# Patient Record
Sex: FEMALE | Race: BLACK OR AFRICAN AMERICAN | NOT HISPANIC OR LATINO | Employment: FULL TIME | URBAN - METROPOLITAN AREA
[De-identification: names, ages, dates, MRNs, and addresses within clinical notes are randomized per-mention and may not be internally consistent; named-entity substitution may affect disease eponyms.]

---

## 2017-01-10 ENCOUNTER — TELEPHONE (OUTPATIENT)
Dept: HEMATOLOGY/ONCOLOGY | Facility: CLINIC | Age: 72
End: 2017-01-10

## 2017-01-10 ENCOUNTER — OFFICE VISIT (OUTPATIENT)
Dept: PODIATRY | Facility: CLINIC | Age: 72
End: 2017-01-10
Payer: MEDICARE

## 2017-01-10 ENCOUNTER — LAB VISIT (OUTPATIENT)
Dept: LAB | Facility: HOSPITAL | Age: 72
End: 2017-01-10
Attending: INTERNAL MEDICINE
Payer: MEDICARE

## 2017-01-10 VITALS
HEIGHT: 62 IN | SYSTOLIC BLOOD PRESSURE: 119 MMHG | DIASTOLIC BLOOD PRESSURE: 65 MMHG | WEIGHT: 183 LBS | BODY MASS INDEX: 33.68 KG/M2 | HEART RATE: 74 BPM

## 2017-01-10 DIAGNOSIS — M76.829 POSTERIOR TIBIAL TENDON DYSFUNCTION: Primary | ICD-10-CM

## 2017-01-10 DIAGNOSIS — D46.4 REFRACTORY ANEMIA: ICD-10-CM

## 2017-01-10 DIAGNOSIS — M79.671 RIGHT FOOT PAIN: ICD-10-CM

## 2017-01-10 LAB
BASOPHILS # BLD AUTO: 0.03 K/UL
BASOPHILS NFR BLD: 0.4 %
DIFFERENTIAL METHOD: ABNORMAL
EOSINOPHIL # BLD AUTO: 0.1 K/UL
EOSINOPHIL NFR BLD: 0.6 %
ERYTHROCYTE [DISTWIDTH] IN BLOOD BY AUTOMATED COUNT: 14.4 %
HCT VFR BLD AUTO: 21.9 %
HGB BLD-MCNC: 7.4 G/DL
LYMPHOCYTES # BLD AUTO: 4.3 K/UL
LYMPHOCYTES NFR BLD: 55.1 %
MCH RBC QN AUTO: 28.1 PG
MCHC RBC AUTO-ENTMCNC: 33.8 %
MCV RBC AUTO: 83 FL
MONOCYTES # BLD AUTO: 0.4 K/UL
MONOCYTES NFR BLD: 5 %
NEUTROPHILS # BLD AUTO: 3 K/UL
NEUTROPHILS NFR BLD: 38.9 %
PLATELET # BLD AUTO: 236 K/UL
PMV BLD AUTO: 9 FL
RBC # BLD AUTO: 2.63 M/UL
WBC # BLD AUTO: 7.81 K/UL

## 2017-01-10 PROCEDURE — 99213 OFFICE O/P EST LOW 20 MIN: CPT | Mod: 25,S$GLB,, | Performed by: PODIATRIST

## 2017-01-10 PROCEDURE — 29540 STRAPPING ANKLE &/FOOT: CPT | Mod: RT,S$GLB,, | Performed by: PODIATRIST

## 2017-01-10 PROCEDURE — 99999 PR PBB SHADOW E&M-EST. PATIENT-LVL IV: CPT | Mod: PBBFAC,,, | Performed by: PODIATRIST

## 2017-01-10 PROCEDURE — 3078F DIAST BP <80 MM HG: CPT | Mod: S$GLB,,, | Performed by: PODIATRIST

## 2017-01-10 PROCEDURE — 1157F ADVNC CARE PLAN IN RCRD: CPT | Mod: S$GLB,,, | Performed by: PODIATRIST

## 2017-01-10 PROCEDURE — 1159F MED LIST DOCD IN RCRD: CPT | Mod: S$GLB,,, | Performed by: PODIATRIST

## 2017-01-10 PROCEDURE — 1160F RVW MEDS BY RX/DR IN RCRD: CPT | Mod: S$GLB,,, | Performed by: PODIATRIST

## 2017-01-10 PROCEDURE — 3074F SYST BP LT 130 MM HG: CPT | Mod: S$GLB,,, | Performed by: PODIATRIST

## 2017-01-10 PROCEDURE — 1125F AMNT PAIN NOTED PAIN PRSNT: CPT | Mod: S$GLB,,, | Performed by: PODIATRIST

## 2017-01-10 NOTE — PROGRESS NOTES
PODIATRY NOTE  PCP: Dr. Shayla Whaley MD    CHIEF COMPLAINT   Chief Complaint   Patient presents with    Follow-up     right PTTD pt has been wearing boot on and off, states occasional pain when out of boot and at night; no pain at current time;     Plantar Fasciitis     right plantar fasciitis       HPI  Adelina Caro is a 71 y.o. female who has a past medical history of Anemia; Anxiety; Arthritis; Asthma; Back pain; Benign colonic polyp; Bulging disc; Cervicalgia (9/17/2013); DDD (degenerative disc disease), lumbar; Depression; Diabetes mellitus; Diverticulosis; Diverticulosis; Hiatal hernia (3/10/2015); Hypertension; Pneumonia; Polyneuropathy; Postmenopausal; and Right rotator cuff tear.     Adelina presents to clinic today complaining of  Right medial arch pain. Pt is routinely seen by Dr. Curran. She states pain is present on medial arch. She has had this problem in the past. She has custom inserts and diabetic shoes. She does have history of neuropathy. She works at [employer] and is on her feet.     She states she is taking tramadol for her shoulder but this not helping her foot pain. She has been wearing CAM walker intermittently which has helped with her pain as well as bengay topical. Pain still present along posterior tibial tendon (pt points to this)    Patient denies other pedal complaints at this time.    Hemoglobin A1C   Date Value Ref Range Status   12/16/2016 5.8 4.5 - 6.2 % Final     Comment:     According to ADA guidelines, hemoglobin A1C <7.0% represents  optimal control in non-pregnant diabetic patients.  Different  metrics may apply to specific populations.   Standards of Medical Care in Diabetes - 2016.  For the purpose of screening for the presence of diabetes:  <5.7%     Consistent with the absence of diabetes  5.7-6.4%  Consistent with increasing risk for diabetes   (prediabetes)  >or=6.5%  Consistent with diabetes  Currently no consensus exists for use of hemoglobin A1C  for diagnosis of  "diabetes for children.     02/17/2016 5.9 4.5 - 6.2 % Final   10/19/2015 5.9 4.5 - 6.2 % Final       REVIEW OF SYSTEMS  General: Denies any fever or chills  Chest: Denies shortness of breath, wheezing, coughing, or sputum production  Heart: Denies chest pain, cold extremities, orthopenia, or reduced exercise tolerance  As noted above and per history of current illness above, otherwise negative in the remainder of the 14 systems.     PHYSICAL EXAM  Vitals:    01/10/17 1536   BP: 119/65   Pulse: 74   Weight: 83 kg (182 lb 15.7 oz)   Height: 5' 2" (1.575 m)   PainSc:   7   PainLoc: Foot       General: This patient is well-developed, well-nourished and appears stated age, well-oriented to person, place and time, and cooperative and pleasant on today's visit      LOWER EXTREMITY  Vascular exam:   · Dorsalis pedis and posterior tibial pulses palpable 2/4 bilaterally.   · Capillary refill time immediate to the toes.   · Feet are warm to the touch. Skin temperature warm to warm from proximally to distally   · There are no varicosities, telangiectasias noted to bilateral foot and ankle regions.   · There are no ecchymoses noted to bilateral foot and ankle regions.   · There is gross lower extremity edema.    Dermatologic exam:   · Skin moist with healthy texture and turgor.  · There are no open ulcerations, lacerations, or fissures to bilateral foot and ankle regions. There are no signs of infection as there is no erythema, no proximal-extending lymphangiitis, no fluctuance, or crepitus noted on palpation to bilateral foot and ankle regions.   · There is no interdigital maceration.   · There are no hyperkeratotic lesions noted to feet. Nails are well-trimmed.    Neurologic exam:  · Epicritic sensation is intact as the patient is able to sense light touch to bilateral foot and ankle regions.   · Achilles and patellar deep tendon reflexes intact  · Babinski reflex absent    Musculoskeletal/Orthopedic exam:   · No symptomatic " structural abnormalities noted  · Muscle strength AT/EHL/EDL/PT: 5/5; Achilles/Gastroc/Soleus: 5/5; PB/PL: 5/5 Muscle tone is normal.  · There is tenderness with palpation of posterior tibial tendon RIGHT foot  · There is tenderness upon palpation medial band of plantar fascia RIGHT  · Ankle joint ROM  B/L limitedDF/PF, non-crepitus  · STJ ROM limited inv/ev, non crepitus   · MTPJ b/l supple DF/PF, non crepitus  · Foot type: maximally pronated with decreased medial longitudinal arch     IMAGING   Reviewed by me and I agree with radiologist findings, 3 views of foot/ankle, reveal:  Results for orders placed during the hospital encounter of 12/01/16   X-Ray Foot Complete Right    Narrative 3 views of the right foot    Comparison: 10/24/2014    Findings: There is no evidence to suggest acute fracture or dislocation.  Dorsal and plantar calcaneal enthesophytes are present.  There is mild degenerative change at the talonavicular joint.  Minimal degenerative change and hallux deformity is present at the 1st MTP joint.    Impression  Above      Electronically signed by: BRADY MACIAS D.O.  Date:     12/01/16  Time:    16:01            ASSESSMENT  1. Posterior tibial tendon dysfunction - Right Foot     2. Acquired pes planovalgus, unspecified laterality           PLAN    1. Patient was educated about clinical and imaging findings, and verbalizes understanding of above.  2. Treatment plan: Patient was educated and counseled regarding tibial tendinitis. At this time, the patient agreed to proceed with conservative treatment for relative support and immobilization by ARIC quinteros with some mild compression applied by Elastoplast bandaging.   Pt declines further physical therapy  3. Rx dm shoes   4. F/u with Dr. Curran in 4-5 wks, if pain still persists consider other therapeutic modalities as patient declines complete immobilization in CAST therapy or Boot.    Future Appointments  Date Time Provider Department Center   1/12/2017  8:40 AM Gordon Mccoy MD [facility] HEM ONC [facility]   2/6/2017 8:30 AM [facility] US1 [facility] ULSOUND [facility]   2/9/2017 1:00 PM Shayla Whaley MD [facility]   2/15/2017 2:40 PM Catherine Curran DPM [facility] POD [facility]   2/17/2017 9:30 AM Sonya Owusu NP [facility] UROLOGY [facility]       Report Electronically Signed By:  Jacquie Washington DPM   Podiatric Medicine & Surgery  [facility]  1/10/2017

## 2017-01-10 NOTE — TELEPHONE ENCOUNTER
----- Message from Gordon Mccoy MD sent at 1/10/2017  3:21 PM CST -----  The patient's CBC has gotten worse I like to see the patient back in the next several days

## 2017-01-10 NOTE — MR AVS SNAPSHOT
[facility] - Podiatry  [address] [place] [address]  Phone: [phone]  Fax: [phone]                  Adelina Caro   1/10/2017 3:20 PM   Office Visit    Description:  Female : 1945   Provider:  Jacquie Washington DPM   Department:  [facility] - Podiatry           Reason for Visit     Follow-up     Plantar Fasciitis           Diagnoses this Visit        Comments    Posterior tibial tendon dysfunction    -  Primary     Acquired pes planovalgus, unspecified laterality                To Do List           Future Appointments        Provider Department Dept Phone    2017 8:40 AM Gordon Mccoy MD [facility] Hemotology Oncology [phone]    2017 8:30 AM [facility] US1 [facility] [phone]    2017 1:00 PM Shayla Whaley MD [facility] [phone]    2/15/2017 2:40 PM Catherine Curran DPM [facility] Podiatry [phone]    2017 9:30 AM Sonya Owusu NP [facility] Urology [phone]      Goals (5 Years of Data)     None      [facility]     [facility] Care [facility] -  Assistance  Registered nurses in the [facility] provide clinical advisement, health education, appointment booking, and other advisory services.  Call for this free service at [phone].             Medications           Message regarding Medications     Verify the changes and/or additions to your medication regime listed below are the same as discussed with your clinician today.  If any of these changes or additions are incorrect, please notify your healthcare provider.             Verify that the below list of medications is an accurate representation of the medications you are currently taking.  If none reported, the list may be blank. If incorrect, please contact your healthcare provider. Carry this list with you in case of emergency.           Current Medications     amitriptyline (ELAVIL) 10 MG tablet TAKE 1 TABLET (10 MG TOTAL) BY MOUTH NIGHTLY AS  NEEDED FOR INSOMNIA.    amlodipine-benazepril 5-20 mg (LOTREL) 5-20 mg per capsule Take 1 capsule by mouth every evening.     blood sugar diagnostic (FREESTYLE INSULINX TEST STRIPS) Strp 1 strip by Misc.(Non-Drug; Combo Route) route daily as needed. For Freestyle Lite Glucometer    blood-glucose meter (FREESTYLE SYSTEM KIT) kit Use as instructed - Freestyle Lite Glucometer    CYANOCOBALAMIN, VITAMIN B-12, (VITAMIN B-12 ORAL) Take 1 capsule by mouth once daily.    desonide (DESOWEN) 0.05 % cream Apply topically 2 (two) times daily. No longer than 2 weeks    dicyclomine (BENTYL) 10 MG capsule TAKE 1 CAPSULE (10 MG TOTAL) BY MOUTH 3 (THREE) TIMES DAILY.    fluticasone (FLONASE) 50 mcg/actuation nasal spray 2 sprays by Each Nare route daily as needed for Rhinitis.    gabapentin (NEURONTIN) 300 MG capsule TAKE 1 CAPSULE ON FIRST DAY THEN 1 CAPSULE TWICE A DAY FOR 1 DAY THEN AS DIRECTED    lancets (ONETOUCH DELICA LANCETS) 33 gauge Misc 1 lancet by Misc.(Non-Drug; Combo Route) route daily as needed. For Freestyle Lite Glucometer    metformin (GLUCOPHAGE) 500 MG tablet TAKE 2 TABLETS (1,000 MG TOTAL) BY MOUTH 2 (TWO) TIMES DAILY WITH MEALS.    methylPREDNISolone (MEDROL DOSEPACK) 4 mg tablet use as directed    montelukast (SINGULAIR) 10 mg tablet TAKE 1 TABLET (10 MG TOTAL) BY MOUTH EVERY EVENING.    MULTIVITAMIN W-MINERALS/LUTEIN (CENTRUM SILVER ORAL) Take by mouth once daily.    nabumetone (RELAFEN) 500 MG tablet Take 1 tablet (500 mg total) by mouth 2 (two) times daily.    pantoprazole (PROTONIX) 40 MG tablet TAKE 1 TABLET (40 MG TOTAL) BY MOUTH ONCE DAILY.    PYRIDOXINE HCL (VITAMIN B-6 ORAL) Take 1 tablet by mouth once daily.    sertraline (ZOLOFT) 50 MG tablet TAKE 1 TABLET (50 MG TOTAL) BY MOUTH ONCE DAILY.    tramadol (ULTRAM) 50 mg tablet Take 1 tablet (50 mg total) by mouth every 6 (six) hours as needed.    albuterol 90 mcg/actuation inhaler Inhale 2 puffs into the lungs every 6 (six) hours as needed for  "Wheezing.    cetirizine (ZYRTEC) 5 MG tablet Take 1 tablet (5 mg total) by mouth once daily.           Clinical Reference Information           Vital Signs - Last Recorded  Most recent update: 1/10/2017  3:39 PM by Leatha Worrell MA    BP Pulse Ht Wt LMP BMI    119/65 (BP Location: Right arm, Patient Position: Sitting, BP Method: Automatic) 74 5' 2" (1.575 m) 83 kg (182 lb 15.7 oz) (Exact Date) 33.47 kg/m2      Blood Pressure          Most Recent Value    BP  119/65      Allergies as of 1/10/2017     Bactrim [Sulfamethoxazole-trimethoprim]    Iodinated Contrast Media - Iv Dye      Immunizations Administered on Date of Encounter - 1/10/2017     None      "

## 2017-01-11 RX ORDER — GABAPENTIN 300 MG/1
CAPSULE ORAL
Qty: 60 CAPSULE | Refills: 5 | Status: SHIPPED | OUTPATIENT
Start: 2017-01-11

## 2017-01-12 ENCOUNTER — LAB VISIT (OUTPATIENT)
Dept: LAB | Facility: HOSPITAL | Age: 72
End: 2017-01-12
Attending: INTERNAL MEDICINE
Payer: MEDICARE

## 2017-01-12 ENCOUNTER — OFFICE VISIT (OUTPATIENT)
Dept: HEMATOLOGY/ONCOLOGY | Facility: CLINIC | Age: 72
End: 2017-01-12
Payer: MEDICARE

## 2017-01-12 VITALS
OXYGEN SATURATION: 98 % | SYSTOLIC BLOOD PRESSURE: 135 MMHG | HEART RATE: 82 BPM | TEMPERATURE: 98 F | DIASTOLIC BLOOD PRESSURE: 66 MMHG | RESPIRATION RATE: 20 BRPM | WEIGHT: 177.5 LBS | HEIGHT: 62 IN | BODY MASS INDEX: 32.66 KG/M2

## 2017-01-12 DIAGNOSIS — D46.4 REFRACTORY ANEMIA: ICD-10-CM

## 2017-01-12 DIAGNOSIS — D46.9 MDS (MYELODYSPLASTIC SYNDROME): Primary | ICD-10-CM

## 2017-01-12 DIAGNOSIS — D46.9 MDS (MYELODYSPLASTIC SYNDROME): ICD-10-CM

## 2017-01-12 LAB
FERRITIN SERPL-MCNC: 1475 NG/ML
HAPTOGLOB SERPL-MCNC: 190 MG/DL
IRON SERPL-MCNC: 58 UG/DL
LDH SERPL L TO P-CCNC: 194 U/L
RETICS/RBC NFR AUTO: 3.3 %
SATURATED IRON: 21 %
TOTAL IRON BINDING CAPACITY: 272 UG/DL
TRANSFERRIN SERPL-MCNC: 184 MG/DL
VIT B12 SERPL-MCNC: 537 PG/ML

## 2017-01-12 PROCEDURE — 36415 COLL VENOUS BLD VENIPUNCTURE: CPT | Mod: PO

## 2017-01-12 PROCEDURE — 1159F MED LIST DOCD IN RCRD: CPT | Mod: S$GLB,,, | Performed by: INTERNAL MEDICINE

## 2017-01-12 PROCEDURE — 3075F SYST BP GE 130 - 139MM HG: CPT | Mod: S$GLB,,, | Performed by: INTERNAL MEDICINE

## 2017-01-12 PROCEDURE — 83010 ASSAY OF HAPTOGLOBIN QUANT: CPT

## 2017-01-12 PROCEDURE — 99215 OFFICE O/P EST HI 40 MIN: CPT | Mod: S$GLB,,, | Performed by: INTERNAL MEDICINE

## 2017-01-12 PROCEDURE — 1160F RVW MEDS BY RX/DR IN RCRD: CPT | Mod: S$GLB,,, | Performed by: INTERNAL MEDICINE

## 2017-01-12 PROCEDURE — 82607 VITAMIN B-12: CPT

## 2017-01-12 PROCEDURE — 83540 ASSAY OF IRON: CPT

## 2017-01-12 PROCEDURE — 83615 LACTATE (LD) (LDH) ENZYME: CPT | Mod: PO

## 2017-01-12 PROCEDURE — 99999 PR PBB SHADOW E&M-EST. PATIENT-LVL III: CPT | Mod: PBBFAC,,, | Performed by: INTERNAL MEDICINE

## 2017-01-12 PROCEDURE — 84165 PROTEIN E-PHORESIS SERUM: CPT

## 2017-01-12 PROCEDURE — 82668 ASSAY OF ERYTHROPOIETIN: CPT

## 2017-01-12 PROCEDURE — 84165 PROTEIN E-PHORESIS SERUM: CPT | Mod: 26,,, | Performed by: PATHOLOGY

## 2017-01-12 PROCEDURE — 85045 AUTOMATED RETICULOCYTE COUNT: CPT

## 2017-01-12 PROCEDURE — 1157F ADVNC CARE PLAN IN RCRD: CPT | Mod: S$GLB,,, | Performed by: INTERNAL MEDICINE

## 2017-01-12 PROCEDURE — 99499 UNLISTED E&M SERVICE: CPT | Mod: S$GLB,,, | Performed by: INTERNAL MEDICINE

## 2017-01-12 PROCEDURE — 83520 IMMUNOASSAY QUANT NOS NONAB: CPT | Mod: 59

## 2017-01-12 PROCEDURE — 3078F DIAST BP <80 MM HG: CPT | Mod: S$GLB,,, | Performed by: INTERNAL MEDICINE

## 2017-01-12 PROCEDURE — 1126F AMNT PAIN NOTED NONE PRSNT: CPT | Mod: S$GLB,,, | Performed by: INTERNAL MEDICINE

## 2017-01-12 PROCEDURE — 82728 ASSAY OF FERRITIN: CPT

## 2017-01-12 NOTE — MR AVS SNAPSHOT
Patient Information     Patient Name Sex Adelina Stephenson Female 1945      Visit Information        Provider Department Dept Phone Center    2017 8:40 AM Gordon Mccoy MD [facility] Hematology Oncology [phone] [facility]      Patient Instructions     None      Your Current Medications Are     amitriptyline (ELAVIL) 10 MG tablet    amlodipine-benazepril 5-20 mg (LOTREL) 5-20 mg per capsule    blood sugar diagnostic (FREESTYLE INSULINX TEST STRIPS) Strp    blood-glucose meter (FREESTYLE SYSTEM KIT) kit    CYANOCOBALAMIN, VITAMIN B-12, (VITAMIN B-12 ORAL)    desonide (DESOWEN) 0.05 % cream    dicyclomine (BENTYL) 10 MG capsule    fluticasone (FLONASE) 50 mcg/actuation nasal spray    gabapentin (NEURONTIN) 300 MG capsule    lancets (ONETOUCH DELICA LANCETS) 33 gauge Misc    metformin (GLUCOPHAGE) 500 MG tablet    methylPREDNISolone (MEDROL DOSEPACK) 4 mg tablet    montelukast (SINGULAIR) 10 mg tablet    MULTIVITAMIN W-MINERALS/LUTEIN (CENTRUM SILVER ORAL)    nabumetone (RELAFEN) 500 MG tablet    pantoprazole (PROTONIX) 40 MG tablet    PYRIDOXINE HCL (VITAMIN B-6 ORAL)    sertraline (ZOLOFT) 50 MG tablet    tramadol (ULTRAM) 50 mg tablet    albuterol 90 mcg/actuation inhaler    cetirizine (ZYRTEC) 5 MG tablet      Appointments for Next Year     2017 12:10 PM NON FASTING LAB (5 min.) [facility]    Arrive at check-in approximately 15 minutes before your scheduled appointment time. Bring all outside medical records and imaging, along with a list of your current medications and insurance card.    [facility] 2nd [facility]    2017  9:00 AM INFUSION 015 MIN (15 min.) [facility] CHAIR [address] [facility]    Arrive at check-in approximately 15 minutes before your scheduled appointment time. Bring all outside medical records and imaging, along with a list of your current medications and insurance card.    2017  8:30 AM [facility]ROPER (45 min.) [facility]  [facility] [address]    Arrive at check-in approximately 15 minutes before your scheduled appointment time. Bring all outside medical records and imaging, along with a list of your current medications and insurance card.    (off [employer]) 2nd Floor    2/9/2017  1:00 PM ESTABLISHED PATIENT EXTENDED (30 min.) [facility] Shayla Whaley MD    Arrive at check-in approximately 15 minutes before your scheduled appointment time. Bring all outside medical records and imaging, along with a list of your current medications and insurance card.    2/15/2017  2:40 PM ESTABLISHED PATIENT (20 min.) [facility] Podiatry Catherine Curran DPM    Arrive at check-in approximately 15 minutes before your scheduled appointment time. Bring all outside medical records and imaging, along with a list of your current medications and insurance card.    (off [employer]) 2nd floor    2/17/2017  9:30 AM ESTABLISHED PATIENT (15 min.) [facility] Urology Sonya Owusu NP    Arrive at check-in approximately 15 minutes before your scheduled appointment time. Bring all outside medical records and imaging, along with a list of your current medications and insurance card.    (off [employer]) 4th floor         Default Flowsheet Data (last 24 hours)      Amb Complex Vitals Michi        01/12/17 0838                Measurements    Weight 80.5 kg (177 lb 7.5 oz)        Height 5' 1.5" (1.562 m)        BSA (Calculated - sq m) 1.87 sq meters        BMI (Calculated) 33.1        /66        Temp 98.2 °F (36.8 °C)        Pulse 82        Resp 20        SpO2 98 %        Pain Assessment    Pain Score Zero                Allergies     Bactrim [Sulfamethoxazole-trimethoprim]     Hospitalized due to acute kidney injury,encephalopathy  And hyponatremia    Iodinated Contrast Media - Iv Dye Anaphylaxis      Current Discharge Medication List     Cannot display discharge medications since this is not an admission.      "

## 2017-01-12 NOTE — PROGRESS NOTES
Subjective:       Patient ID: Adelina Caro is a 71 y.o. female.    Chief Complaint: Results and Anemia    Anemia   There has been no abdominal pain, bruising/bleeding easily, confusion, fever, light-headedness, pallor or palpitations. Signs of blood loss that are not present include vaginal bleeding.    71-year-old female who is referred for evaluation of anemia patient is seen by me in 2014 with extensive workup as well as bone marrow aspirate and biopsy which revealed normal chromosomal pattern with hypercellular line is consistent with refractory anemia patient has not been seen by me since    Past Medical History   Diagnosis Date    Anemia     Anxiety     Arthritis     Asthma     Back pain     Benign colonic polyp     Bulging disc      low back    Cervicalgia 9/17/2013    DDD (degenerative disc disease), lumbar     Depression     Diabetes mellitus      borderline    Diverticulosis     Diverticulosis     Hiatal hernia 3/10/2015     S/P hernia repair x  2    Hypertension     Pneumonia     Polyneuropathy     Postmenopausal      no history of abnormal pap smear    Right rotator cuff tear      right shoulder     Family History   Problem Relation Age of Onset    Colon cancer Sister     Colon cancer Sister     Ovarian cancer Sister     Hypertension Mother     Heart disease Mother      MI    Breast cancer Mother     Cataracts Mother     Diabetes Sister     Cancer Sister      unknown    Heart disease Sister      MI/CAD    Stroke Neg Hx      Social History     Social History    Marital status:      Spouse name: N/A    Number of children: 3    Years of education: N/A     Occupational History    [employer]     Social History Main Topics    Smoking status: Former Smoker     Packs/day: 1.00     Years: 3.00     Types: Cigarettes     Quit date: 8/22/1985    Smokeless tobacco: Never Used    Alcohol use No    Drug use: No    Sexual  activity: No     Other Topics Concern    Not on file     Social History Narrative    She wears seatbelt.     Past Surgical History   Procedure Laterality Date    Hernia surgery       left lower abd wall;  mesh used    Tubal ligation      Hernia repair      Total abdominal hysterectomy w/ bilateral salpingoophorectomy      Bladder surgery      Colonoscopy      Bone marrow biopsy         Labs:  Lab Results   Component Value Date    WBC 7.81 01/10/2017    HGB 7.4 (L) 01/10/2017    HCT 21.9 (L) 01/10/2017    MCV 83 01/10/2017     01/10/2017     BMP  Lab Results   Component Value Date     12/16/2016    K 4.6 12/16/2016     12/16/2016    CO2 26 12/16/2016    BUN 15 12/16/2016    CREATININE 0.8 12/16/2016    CALCIUM 9.4 12/16/2016    ANIONGAP 8 12/16/2016    ESTGFRAFRICA >60.0 12/16/2016    EGFRNONAA >60.0 12/16/2016     Lab Results   Component Value Date    ALT 18 05/27/2016    AST 27 05/27/2016    ALKPHOS 50 (L) 05/27/2016    BILITOT 0.5 05/27/2016       Lab Results   Component Value Date    IRON 66 03/31/2015    TIBC 277 03/31/2015    FERRITIN 948 (H) 03/31/2015     Lab Results   Component Value Date    XUIXROIZ70 606 11/15/2013     No results found for: FOLATE  Lab Results   Component Value Date    TSH 1.021 02/17/2016         Review of Systems   Constitutional: Positive for fatigue. Negative for activity change, appetite change, chills, diaphoresis, fever and unexpected weight change.   HENT: Negative for congestion, dental problem, drooling, ear discharge, ear pain, facial swelling, hearing loss, mouth sores, nosebleeds, postnasal drip, rhinorrhea, sinus pressure, sneezing, sore throat, tinnitus, trouble swallowing and voice change.    Eyes: Negative for photophobia, pain, discharge, redness, itching and visual disturbance.   Respiratory: Negative for cough, choking, chest tightness, shortness of breath, wheezing and stridor.    Cardiovascular: Negative for chest pain, palpitations and leg  swelling.   Gastrointestinal: Negative for abdominal distention, abdominal pain, anal bleeding, blood in stool, constipation, diarrhea, nausea, rectal pain and vomiting.   Endocrine: Negative for cold intolerance, heat intolerance, polydipsia, polyphagia and polyuria.   Genitourinary: Negative for decreased urine volume, difficulty urinating, dyspareunia, dysuria, enuresis, flank pain, frequency, genital sores, hematuria, menstrual problem, pelvic pain, urgency, vaginal bleeding, vaginal discharge and vaginal pain.   Musculoskeletal: Negative for arthralgias, back pain, gait problem, joint swelling, myalgias, neck pain and neck stiffness.   Skin: Negative for color change, pallor and rash.   Allergic/Immunologic: Negative for environmental allergies, food allergies and immunocompromised state.   Neurological: Positive for weakness. Negative for dizziness, tremors, seizures, syncope, facial asymmetry, speech difficulty, light-headedness, numbness and headaches.   Hematological: Negative for adenopathy. Does not bruise/bleed easily.   Psychiatric/Behavioral: Negative for agitation, behavioral problems, confusion, decreased concentration, dysphoric mood, hallucinations, self-injury, sleep disturbance and suicidal ideas. The patient is not nervous/anxious and is not hyperactive.        Objective:      Physical Exam   Constitutional: She is oriented to person, place, and time. She appears well-developed and well-nourished. No distress.   HENT:   Head: Normocephalic and atraumatic.   Right Ear: External ear normal.   Left Ear: External ear normal.   Nose: Nose normal. Right sinus exhibits no maxillary sinus tenderness and no frontal sinus tenderness. Left sinus exhibits no maxillary sinus tenderness and no frontal sinus tenderness.   Mouth/Throat: Oropharynx is clear and moist. No oropharyngeal exudate.   Eyes: Conjunctivae, EOM and lids are normal. Pupils are equal, round, and reactive to light. Right eye exhibits no  discharge. Left eye exhibits no discharge. Right conjunctiva is not injected. Right conjunctiva has no hemorrhage. Left conjunctiva is not injected. Left conjunctiva has no hemorrhage. No scleral icterus.   Neck: Normal range of motion. Neck supple. No JVD present. No tracheal deviation present. No thyromegaly present.   Cardiovascular: Normal rate, regular rhythm, normal heart sounds and intact distal pulses.    Pulmonary/Chest: Effort normal and breath sounds normal. No stridor. No respiratory distress. She exhibits no tenderness.   Abdominal: Soft. Bowel sounds are normal. She exhibits no distension and no mass. There is no splenomegaly or hepatomegaly. There is no tenderness. There is no rebound.   Musculoskeletal: Normal range of motion. She exhibits no edema or tenderness.   Lymphadenopathy:     She has no cervical adenopathy.     She has no axillary adenopathy.        Right: No supraclavicular adenopathy present.        Left: No supraclavicular adenopathy present.   Neurological: She is alert and oriented to person, place, and time. No cranial nerve deficit. Coordination normal.   Skin: Skin is dry. No rash noted. She is not diaphoretic. No erythema.   Psychiatric: She has a normal mood and affect. Her behavior is normal. Judgment and thought content normal.   Vitals reviewed.          Assessment:       1. MDS (myelodysplastic syndrome)    2. Refractory anemia            Plan:         high likelihood refractory anemia.  No evidence of excess blasts noted on previous marrow in 2015 at this point my recommendations are that the patient have laboratory baseline today including erythropoietin level and iron status if acceptable would recommend start on Procrit 20,000 units weekly with repeat CBC in 3 weeks for response to therapy discussed indications with her time spent 40 minutes [id number]

## 2017-01-13 LAB
ALBUMIN SERPL ELPH-MCNC: 3.8 G/DL
ALPHA1 GLOB SERPL ELPH-MCNC: 0.31 G/DL
ALPHA2 GLOB SERPL ELPH-MCNC: 0.88 G/DL
B-GLOBULIN SERPL ELPH-MCNC: 0.81 G/DL
GAMMA GLOB SERPL ELPH-MCNC: 1.1 G/DL
KAPPA LC SER QL IA: 3.76 MG/DL
KAPPA LC/LAMBDA SER IA: 1.83
LAMBDA LC SER QL IA: 2.05 MG/DL
PATHOLOGIST INTERPRETATION SPE: NORMAL
PROT SERPL-MCNC: 6.9 G/DL

## 2017-01-16 LAB — ERYTHROPOIETIN: 30.2 MIU/ML

## 2017-01-24 ENCOUNTER — INFUSION (OUTPATIENT)
Dept: INFUSION THERAPY | Facility: HOSPITAL | Age: 72
End: 2017-01-24
Attending: INTERNAL MEDICINE
Payer: MEDICARE

## 2017-01-24 VITALS — SYSTOLIC BLOOD PRESSURE: 129 MMHG | HEART RATE: 73 BPM | TEMPERATURE: 98 F | DIASTOLIC BLOOD PRESSURE: 71 MMHG

## 2017-01-24 DIAGNOSIS — D46.4 REFRACTORY ANEMIA: ICD-10-CM

## 2017-01-24 DIAGNOSIS — D46.9 MDS (MYELODYSPLASTIC SYNDROME): Primary | ICD-10-CM

## 2017-01-24 PROCEDURE — 96372 THER/PROPH/DIAG INJ SC/IM: CPT | Mod: PO

## 2017-01-24 PROCEDURE — 63600175 PHARM REV CODE 636 W HCPCS: Mod: PO | Performed by: INTERNAL MEDICINE

## 2017-01-24 RX ADMIN — ERYTHROPOIETIN 20000 UNITS: 20000 INJECTION, SOLUTION INTRAVENOUS; SUBCUTANEOUS at 09:01

## 2017-01-24 NOTE — PATIENT INSTRUCTIONS
[facility]  [address] [facility] [address]  [address] [address]  [phone] phone     [phone] fax  Hours of Operation: Monday- Friday 8:00am- 5:00pm  After hours phone  [phone]  Hematology / Oncology Physicians on call      Dr. Darius Pitts    Please call with any concerns regarding your appointment today.

## 2017-01-24 NOTE — MR AVS SNAPSHOT
Patient Information     Patient Name Sex Adelina Stephenson Female 1945      Visit Information        Provider Department Dept Phone Center    2017 9:00 AM [facility] Chemo Infusion [facility] Chemotherapy Infusion [phone] [facility]      Patient Instructions      [facility]  [address] [facility] [address]  [address] [address]  [phone] phone     [phone] fax  Hours of Operation: Monday- Friday 8:00am- 5:00pm  After hours phone  [phone]  Hematology / Oncology Physicians on call      Dr. Darius Pitts    Please call with any concerns regarding your appointment today.       Your Current Medications Are     amitriptyline (ELAVIL) 10 MG tablet    amlodipine-benazepril 5-20 mg (LOTREL) 5-20 mg per capsule    blood sugar diagnostic (FREESTYLE INSULINX TEST STRIPS) Strp    blood-glucose meter (FREESTYLE SYSTEM KIT) kit    CYANOCOBALAMIN, VITAMIN B-12, (VITAMIN B-12 ORAL)    desonide (DESOWEN) 0.05 % cream    dicyclomine (BENTYL) 10 MG capsule    fluticasone (FLONASE) 50 mcg/actuation nasal spray    gabapentin (NEURONTIN) 300 MG capsule    lancets (ONETOUCH DELICA LANCETS) 33 gauge Misc    metformin (GLUCOPHAGE) 500 MG tablet    methylPREDNISolone (MEDROL DOSEPACK) 4 mg tablet    montelukast (SINGULAIR) 10 mg tablet    MULTIVITAMIN W-MINERALS/LUTEIN (CENTRUM SILVER ORAL)    nabumetone (RELAFEN) 500 MG tablet    pantoprazole (PROTONIX) 40 MG tablet    PYRIDOXINE HCL (VITAMIN B-6 ORAL)    sertraline (ZOLOFT) 50 MG tablet    tramadol (ULTRAM) 50 mg tablet    albuterol 90 mcg/actuation inhaler    cetirizine (ZYRTEC) 5 MG tablet      Facility-Administered Medications     epoetin teresa injection 20,000 Units      Appointments for Next Year     2017  9:30 AM INFUSION 015 MIN (15 min.) [facility]    Arrive at check-in approximately 15 minutes before your scheduled appointment time. Bring all outside medical  records and imaging, along with a list of your current medications and insurance card.    2/6/2017  8:30 AM US RETROPER (45 min.) [facility] US1    Arrive at check-in approximately 15 minutes before your scheduled appointment time. Bring all outside medical records and imaging, along with a list of your current medications and insurance card.    (off [address]) 2nd Floor    2/6/2017  9:30 AM INFUSION 015 MIN (15 min.) [facility] CHAIR 01 SUM    Arrive at check-in approximately 15 minutes before your scheduled appointment time. Bring all outside medical records and imaging, along with a list of your current medications and insurance card.    2/9/2017  1:00 PM ESTABLISHED PATIENT EXTENDED (30 min.) [facility] Shayla Whaley MD    Arrive at check-in approximately 15 minutes before your scheduled appointment time. Bring all outside medical records and imaging, along with a list of your current medications and insurance card.    2/15/2017  1:30 PM NON FASTING LAB (5 min.) [facility]    Arrive at check-in approximately 15 minutes before your scheduled appointment time. Bring all outside medical records and imaging, along with a list of your current medications and insurance card.    (off [address]) 2nd floor    2/15/2017  2:00 PM ESTABLISHED PATIENT (20 min.) [facility] Hemotology Oncology Gordon Mccoy MD    Arrive at check-in approximately 15 minutes before your scheduled appointment time. Bring all outside medical records and imaging, along with a list of your current medications and insurance card.    (off [address]) 3rd Floor    2/15/2017  2:30 PM INFUSION 015 MIN (15 min.) [facility] CHAIR 01 SUM    Arrive at check-in approximately 15 minutes before your scheduled appointment time. Bring all outside medical records and imaging, along with a list of your current medications  and insurance card.    2/15/2017  2:40 PM ESTABLISHED PATIENT (20 min.) [facility] - Podiatry Catherine Curran DPM    Arrive at check-in approximately 15 minutes before your scheduled appointment time. Bring all outside medical records and imaging, along with a list of your current medications and insurance card.    (off [address]) 2nd floor    2/17/2017  9:30 AM ESTABLISHED PATIENT (15 min.) [facility] - Urology Sonya Owusu NP    Arrive at check-in approximately 15 minutes before your scheduled appointment time. Bring all outside medical records and imaging, along with a list of your current medications and insurance card.    (off [address]) 4th floor         Default Flowsheet Data (last 24 hours)      Amb Complex Vitals Michi        01/24/17 0858                Measurements    /71        Temp 98.2 °F (36.8 °C)        Pulse 73        Pain Assessment    Pain Score Four        Pain Frequency 2 Intermittent        Pain Loc LEG   right                Allergies     Bactrim [Sulfamethoxazole-trimethoprim]     Hospitalized due to acute kidney injury,encephalopathy  And hyponatremia    Iodinated Contrast Media - Iv Dye Anaphylaxis      Medications You Received from 01/23/2017 0911 to 01/24/2017 0911        Date/Time Order Dose Route Action     01/24/2017 0904 epoetin teresa injection 20,000 Units 20,000 Units Subcutaneous Given      Current Discharge Medication List     Cannot display discharge medications since this is not an admission.

## 2017-01-25 RX ORDER — TRAMADOL HYDROCHLORIDE 50 MG/1
TABLET ORAL
Qty: 30 TABLET | Refills: 0 | Status: SHIPPED | OUTPATIENT
Start: 2017-01-25 | End: 2017-05-02 | Stop reason: SDUPTHER

## 2017-01-31 ENCOUNTER — INFUSION (OUTPATIENT)
Dept: INFUSION THERAPY | Facility: HOSPITAL | Age: 72
End: 2017-01-31
Attending: INTERNAL MEDICINE
Payer: MEDICARE

## 2017-01-31 VITALS — HEART RATE: 80 BPM | SYSTOLIC BLOOD PRESSURE: 126 MMHG | DIASTOLIC BLOOD PRESSURE: 69 MMHG | RESPIRATION RATE: 18 BRPM

## 2017-01-31 DIAGNOSIS — D46.4 REFRACTORY ANEMIA: ICD-10-CM

## 2017-01-31 DIAGNOSIS — D46.9 MDS (MYELODYSPLASTIC SYNDROME): Primary | ICD-10-CM

## 2017-01-31 PROCEDURE — 96372 THER/PROPH/DIAG INJ SC/IM: CPT | Mod: PO

## 2017-01-31 PROCEDURE — 63600175 PHARM REV CODE 636 W HCPCS: Mod: PO | Performed by: INTERNAL MEDICINE

## 2017-01-31 RX ADMIN — ERYTHROPOIETIN 20000 UNITS: 20000 INJECTION, SOLUTION INTRAVENOUS; SUBCUTANEOUS at 09:01

## 2017-01-31 NOTE — MR AVS SNAPSHOT
Patient Information     Patient Name Sex     Adelina Caro Female 1945      Visit Information        Provider Department Dept Phone Center    2017 9:30 AM [facility] Chemo Infusion [facility] Chemotherapy Infusion [phone] [facility]      Patient Instructions     None      Your Current Medications Are     albuterol 90 mcg/actuation inhaler    amitriptyline (ELAVIL) 10 MG tablet    amlodipine-benazepril 5-20 mg (LOTREL) 5-20 mg per capsule    blood sugar diagnostic (FREESTYLE INSULINX TEST STRIPS) Strp    blood-glucose meter (FREESTYLE SYSTEM KIT) kit    cetirizine (ZYRTEC) 5 MG tablet    CYANOCOBALAMIN, VITAMIN B-12, (VITAMIN B-12 ORAL)    desonide (DESOWEN) 0.05 % cream    dicyclomine (BENTYL) 10 MG capsule    fluticasone (FLONASE) 50 mcg/actuation nasal spray    gabapentin (NEURONTIN) 300 MG capsule    lancets (ONETOUCH DELICA LANCETS) 33 gauge Misc    metformin (GLUCOPHAGE) 500 MG tablet    methylPREDNISolone (MEDROL DOSEPACK) 4 mg tablet    montelukast (SINGULAIR) 10 mg tablet    MULTIVITAMIN W-MINERALS/LUTEIN (CENTRUM SILVER ORAL)    nabumetone (RELAFEN) 500 MG tablet    pantoprazole (PROTONIX) 40 MG tablet    PYRIDOXINE HCL (VITAMIN B-6 ORAL)    sertraline (ZOLOFT) 50 MG tablet    tramadol (ULTRAM) 50 mg tablet      Facility-Administered Medications     epoetin teresa injection 20,000 Units      Appointments for Next Year     2017  8:30 AM US RETROPER (45 min.) [facility] US1    Arrive at check-in approximately 15 minutes before your scheduled appointment time. Bring all outside medical records and imaging, along with a list of your current medications and insurance card.    (off [address] [facility]) 2nd Floor    2017  9:30 AM INFUSION 015 MIN (15 min.) [facility] CHAIR 01 [facility]    Arrive at check-in approximately 15 minutes before your scheduled appointment time. Bring all outside medical records and imaging, along with a list of your current medications and  insurance card.    2/9/2017  1:00 PM ESTABLISHED PATIENT EXTENDED (30 min.) [facility] Shayla Whaley MD    Arrive at check-in approximately 15 minutes before your scheduled appointment time. Bring all outside medical records and imaging, along with a list of your current medications and insurance card.    2/15/2017  1:30 PM NON FASTING LAB (5 min.) [facility] LABORATORY, L[facility]IE    Arrive at check-in approximately 15 minutes before your scheduled appointment time. Bring all outside medical records and imaging, along with a list of your current medications and insurance card.    (off [address]) 2nd floor    2/15/2017  2:00 PM ESTABLISHED PATIENT (20 min.) [facility] Hemotology Oncology Gordon Mccoy MD    Arrive at check-in approximately 15 minutes before your scheduled appointment time. Bring all outside medical records and imaging, along with a list of your current medications and insurance card.    (off [address]) 3rd Floor    2/15/2017  2:30 PM INFUSION 015 MIN (15 min.) [facility] CHAIR 01 [facility]    Arrive at check-in approximately 15 minutes before your scheduled appointment time. Bring all outside medical records and imaging, along with a list of your current medications and insurance card.    2/15/2017  2:40 PM ESTABLISHED PATIENT (20 min.) [facility] - Podiatry Catherine Curran DPM    Arrive at check-in approximately 15 minutes before your scheduled appointment time. Bring all outside medical records and imaging, along with a list of your current medications and insurance card.    (off [address]) 2nd floor    2/17/2017  9:30 AM ESTABLISHED PATIENT (15 min.) [facility] - Urology Sonya Owusu NP    Arrive at check-in approximately 15 minutes before your scheduled appointment time. Bring all outside medical records and imaging, along with a list of your current medications and insurance card.    (off [address]) 4th floor         Default  Flowsheet Data (last 24 hours)      Amb Complex Vitals Michi        01/31/17 0924                Measurements    /69        Pulse 80        Resp 18        Pain Assessment    Pain Score Zero                Allergies     Bactrim [Sulfamethoxazole-trimethoprim]     Hospitalized due to acute kidney injury,encephalopathy  And hyponatremia    Iodinated Contrast Media - Iv Dye Anaphylaxis      Medications You Received from 01/30/2017 0932 to 01/31/2017 0932        Date/Time Order Dose Route Action     01/31/2017 0930 epoetin teresa injection 20,000 Units 20,000 Units Subcutaneous Given      Current Discharge Medication List     Cannot display discharge medications since this is not an admission.

## 2017-02-03 ENCOUNTER — TELEPHONE (OUTPATIENT)
Dept: RADIOLOGY | Facility: HOSPITAL | Age: 72
End: 2017-02-03

## 2017-02-06 ENCOUNTER — INFUSION (OUTPATIENT)
Dept: INFUSION THERAPY | Facility: HOSPITAL | Age: 72
End: 2017-02-06
Attending: INTERNAL MEDICINE
Payer: MEDICARE

## 2017-02-06 ENCOUNTER — HOSPITAL ENCOUNTER (OUTPATIENT)
Dept: RADIOLOGY | Facility: HOSPITAL | Age: 72
Discharge: HOME | End: 2017-02-06
Attending: UROLOGY
Payer: MEDICARE

## 2017-02-06 VITALS
SYSTOLIC BLOOD PRESSURE: 135 MMHG | RESPIRATION RATE: 18 BRPM | TEMPERATURE: 98 F | HEART RATE: 87 BPM | DIASTOLIC BLOOD PRESSURE: 72 MMHG

## 2017-02-06 DIAGNOSIS — D46.4 REFRACTORY ANEMIA: ICD-10-CM

## 2017-02-06 DIAGNOSIS — Q61.02 MULTIPLE RENAL CYSTS: ICD-10-CM

## 2017-02-06 DIAGNOSIS — D46.9 MDS (MYELODYSPLASTIC SYNDROME): Primary | ICD-10-CM

## 2017-02-06 PROCEDURE — 63600175 PHARM REV CODE 636 W HCPCS: Mod: PO | Performed by: INTERNAL MEDICINE

## 2017-02-06 PROCEDURE — 76770 US EXAM ABDO BACK WALL COMP: CPT | Mod: 26,,, | Performed by: RADIOLOGY

## 2017-02-06 PROCEDURE — 96372 THER/PROPH/DIAG INJ SC/IM: CPT | Mod: PO

## 2017-02-06 RX ADMIN — ERYTHROPOIETIN 20000 UNITS: 20000 INJECTION, SOLUTION INTRAVENOUS; SUBCUTANEOUS at 09:02

## 2017-02-06 NOTE — MR AVS SNAPSHOT
Patient Information     Patient Name Sex Adelina Stephenson Female 1945      Visit Information        Provider Department Dept Phone Center    2017 9:30 AM [facility] Chemo Infusion [facility] Chemotherapy Infusion [phone] [facility]      Patient Instructions      [facility]  [address] [facility] [address]  [address] [address]  [phone] phone     [phone] fax  Hours of Operation: Monday- Friday 8:00am- 5:00pm  After hours phone  [phone]  Hematology / Oncology Physicians on call      Dr. Darius Pitts    Please call with any concerns regarding your appointment today.       Your Current Medications Are     albuterol 90 mcg/actuation inhaler    amitriptyline (ELAVIL) 10 MG tablet    amlodipine-benazepril 5-20 mg (LOTREL) 5-20 mg per capsule    blood sugar diagnostic (FREESTYLE INSULINX TEST STRIPS) Strp    blood-glucose meter (FREESTYLE SYSTEM KIT) kit    cetirizine (ZYRTEC) 5 MG tablet    CYANOCOBALAMIN, VITAMIN B-12, (VITAMIN B-12 ORAL)    desonide (DESOWEN) 0.05 % cream    dicyclomine (BENTYL) 10 MG capsule    fluticasone (FLONASE) 50 mcg/actuation nasal spray    gabapentin (NEURONTIN) 300 MG capsule    lancets (ONETOUCH DELICA LANCETS) 33 gauge Misc    metformin (GLUCOPHAGE) 500 MG tablet    methylPREDNISolone (MEDROL DOSEPACK) 4 mg tablet    montelukast (SINGULAIR) 10 mg tablet    MULTIVITAMIN W-MINERALS/LUTEIN (CENTRUM SILVER ORAL)    nabumetone (RELAFEN) 500 MG tablet    pantoprazole (PROTONIX) 40 MG tablet    PYRIDOXINE HCL (VITAMIN B-6 ORAL)    sertraline (ZOLOFT) 50 MG tablet    tramadol (ULTRAM) 50 mg tablet      Facility-Administered Medications     epoetin teresa injection 20,000 Units      Appointments for Next Year     2017  9:30 AM INFUSION 015 MIN (15 min.) [facility] CHAIR 01 [facility]    Arrive at check-in approximately 15 minutes before your scheduled appointment time. Bring all outside medical  records and imaging, along with a list of your current medications and insurance card.    2/9/2017  1:00 PM ESTABLISHED PATIENT EXTENDED (30 min.) [facility] Shayla Whaley MD    Arrive at check-in approximately 15 minutes before your scheduled appointment time. Bring all outside medical records and imaging, along with a list of your current medications and insurance card.    2/15/2017  1:30 PM NON FASTING LAB (5 min.) [facility]    Arrive at check-in approximately 15 minutes before your scheduled appointment time. Bring all outside medical records and imaging, along with a list of your current medications and insurance card.    (off [address]) 2nd floor    2/15/2017  2:00 PM ESTABLISHED PATIENT (20 min.) [facility] Hemotology Oncology Gordon Mccoy MD    Arrive at check-in approximately 15 minutes before your scheduled appointment time. Bring all outside medical records and imaging, along with a list of your current medications and insurance card.    (off [address]) 3rd Floor    2/15/2017  2:30 PM INFUSION 015 MIN (15 min.) [facility] CHAIR 01 SUM    Arrive at check-in approximately 15 minutes before your scheduled appointment time. Bring all outside medical records and imaging, along with a list of your current medications and insurance card.    2/15/2017  2:40 PM ESTABLISHED PATIENT (20 min.) [facility] - Podiatry Catherine Curran DPM    Arrive at check-in approximately 15 minutes before your scheduled appointment time. Bring all outside medical records and imaging, along with a list of your current medications and insurance card.    (off [address]) 2nd floor    2/17/2017  9:30 AM ESTABLISHED PATIENT (15 min.) [facility] Urology Sonya Owusu NP    Arrive at check-in approximately 15 minutes before your scheduled appointment time. Bring all outside medical records and imaging, along with a list of your current medications  and insurance card.    (off [facility]) 4th floor         Default Flowsheet Data (last 24 hours)      Amb Complex Vitals Michi        02/06/17 0913                Measurements    /72        Temp 97.7 °F (36.5 °C)        Pulse 87        Resp 18        Pain Assessment    Pain Score Zero                Allergies     Bactrim [Sulfamethoxazole-trimethoprim]     Hospitalized due to acute kidney injury,encephalopathy  And hyponatremia    Iodinated Contrast Media - Iv Dye Anaphylaxis      Medications You Received from 02/05/2017 0921 to 02/06/2017 0921        Date/Time Order Dose Route Action     02/06/2017 0917 epoetin teresa injection 20,000 Units 20,000 Units Subcutaneous Given      Current Discharge Medication List     Cannot display discharge medications since this is not an admission.

## 2017-02-06 NOTE — PATIENT INSTRUCTIONS
[facility]  [address] [facility] [address]  [address] [address]  [phone] phone     [phone] fax  Hours of Operation: Monday- Friday 8:00am- 5:00pm  After hours phone  [phone]  Hematology / Oncology Physicians on call      Dr. Darius Pitts    Please call with any concerns regarding your appointment today.

## 2017-02-08 ENCOUNTER — TELEPHONE (OUTPATIENT)
Dept: FAMILY MEDICINE | Facility: CLINIC | Age: 72
End: 2017-02-08

## 2017-02-08 NOTE — PROGRESS NOTES
Notify pt her L renal cyst is slightly larger.  She has urology  f/u w/ Sonya 2/17.  pls change this to a f/u w/ the urologist please since she is not in.

## 2017-02-08 NOTE — TELEPHONE ENCOUNTER
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 She will keep her appt tomorrow at 1:00 with Dr Whaley.

## 2017-02-09 ENCOUNTER — OFFICE VISIT (OUTPATIENT)
Dept: FAMILY MEDICINE | Facility: CLINIC | Age: 72
End: 2017-02-09
Payer: MEDICARE

## 2017-02-09 VITALS
RESPIRATION RATE: 18 BRPM | TEMPERATURE: 97 F | DIASTOLIC BLOOD PRESSURE: 62 MMHG | SYSTOLIC BLOOD PRESSURE: 124 MMHG | HEART RATE: 82 BPM | WEIGHT: 177 LBS | BODY MASS INDEX: 32.91 KG/M2 | OXYGEN SATURATION: 98 %

## 2017-02-09 DIAGNOSIS — F33.9 RECURRENT MAJOR DEPRESSIVE DISORDER, REMISSION STATUS UNSPECIFIED: ICD-10-CM

## 2017-02-09 DIAGNOSIS — M51.36 DDD (DEGENERATIVE DISC DISEASE), LUMBAR: ICD-10-CM

## 2017-02-09 DIAGNOSIS — Z78.0 POSTMENOPAUSAL: ICD-10-CM

## 2017-02-09 DIAGNOSIS — K57.92 DIVERTICULITIS OF INTESTINE WITHOUT PERFORATION OR ABSCESS WITHOUT BLEEDING, UNSPECIFIED PART OF INTESTINAL TRACT: ICD-10-CM

## 2017-02-09 DIAGNOSIS — E11.59 HYPERTENSION ASSOCIATED WITH DIABETES: ICD-10-CM

## 2017-02-09 DIAGNOSIS — D46.4 REFRACTORY ANEMIA: ICD-10-CM

## 2017-02-09 DIAGNOSIS — E11.9 TYPE 2 DIABETES MELLITUS WITHOUT COMPLICATION, WITHOUT LONG-TERM CURRENT USE OF INSULIN: ICD-10-CM

## 2017-02-09 DIAGNOSIS — Z12.31 OTHER SCREENING MAMMOGRAM: ICD-10-CM

## 2017-02-09 DIAGNOSIS — I15.2 HYPERTENSION ASSOCIATED WITH DIABETES: ICD-10-CM

## 2017-02-09 DIAGNOSIS — Z00.00 ANNUAL PHYSICAL EXAM: Primary | ICD-10-CM

## 2017-02-09 DIAGNOSIS — Z23 NEED FOR PROPHYLACTIC VACCINATION AGAINST STREPTOCOCCUS PNEUMONIAE (PNEUMOCOCCUS): ICD-10-CM

## 2017-02-09 DIAGNOSIS — E66.9 OBESITY (BMI 30.0-34.9): ICD-10-CM

## 2017-02-09 DIAGNOSIS — M85.80 OSTEOPENIA: ICD-10-CM

## 2017-02-09 DIAGNOSIS — K63.5 BENIGN COLON POLYP: ICD-10-CM

## 2017-02-09 DIAGNOSIS — D46.9 MDS (MYELODYSPLASTIC SYNDROME): ICD-10-CM

## 2017-02-09 PROCEDURE — 99397 PER PM REEVAL EST PAT 65+ YR: CPT | Mod: 25,S$GLB,, | Performed by: FAMILY MEDICINE

## 2017-02-09 PROCEDURE — 99499 UNLISTED E&M SERVICE: CPT | Mod: S$GLB,,, | Performed by: FAMILY MEDICINE

## 2017-02-09 PROCEDURE — 99999 PR PBB SHADOW E&M-EST. PATIENT-LVL III: CPT | Mod: PBBFAC,,, | Performed by: FAMILY MEDICINE

## 2017-02-09 PROCEDURE — 90670 PCV13 VACCINE IM: CPT | Mod: S$GLB,,, | Performed by: FAMILY MEDICINE

## 2017-02-09 PROCEDURE — G0009 ADMIN PNEUMOCOCCAL VACCINE: HCPCS | Mod: S$GLB,,, | Performed by: FAMILY MEDICINE

## 2017-02-09 PROCEDURE — 3074F SYST BP LT 130 MM HG: CPT | Mod: S$GLB,,, | Performed by: FAMILY MEDICINE

## 2017-02-09 PROCEDURE — 3078F DIAST BP <80 MM HG: CPT | Mod: S$GLB,,, | Performed by: FAMILY MEDICINE

## 2017-02-09 NOTE — MR AVS SNAPSHOT
[facility] Medicine  [address] [address]  Phone: [phone]                  Adelina Caro   2017 1:00 PM   Office Visit    Description:  Female : 1945   Provider:  Shayla Whaley MD   Department:  [facility]           Reason for Visit     Annual Exam           Diagnoses this Visit        Comments    Annual physical exam    -  Primary     Hypertension associated with diabetes         Type 2 diabetes mellitus without complication, without long-term current use of insulin         Refractory anemia         MDS (myelodysplastic syndrome)         Benign colon polyp         DDD (degenerative disc disease), lumbar         Recurrent major depressive disorder, remission status unspecified         Diverticulitis of intestine without perforation or abscess without bleeding, unspecified part of intestinal tract         Obesity (BMI 30.0-34.9)         Osteopenia         Postmenopausal         Need for prophylactic vaccination against Streptococcus pneumoniae (pneumococcus)         Other screening mammogram                To Do List           Future Appointments        Provider Department Dept Phone    2/15/2017 1:30 PM LAB[facility]ATORY, [facility] [phone]    2/15/2017 2:00 PM Gordon Mccoy MD [facility] Hemotology Oncology [phone]    2/15/2017 2:30 PM CHAIR 01 [facility] [phone]    2/15/2017 2:40 PM Catherine Curran DPM [facility] Podiatry [phone]    2017 9:30 AM Sonya Owusu NP [facility] Urology [phone]      Goals (5 Years of Data)     None      Follow-Up and Disposition     Return in about 6 months (around 2017) for diabetes and blood pressure follow up.      [facility]     [facility] -  Assistance  Registered nurses in the [facility] provide clinical advisement, health education, appointment booking, and other advisory  services.  Call for this free service at [phone].             Medications           Message regarding Medications     Verify the changes and/or additions to your medication regime listed below are the same as discussed with your clinician today.  If any of these changes or additions are incorrect, please notify your healthcare provider.             Verify that the below list of medications is an accurate representation of the medications you are currently taking.  If none reported, the list may be blank. If incorrect, please contact your healthcare provider. Carry this list with you in case of emergency.           Current Medications     albuterol 90 mcg/actuation inhaler Inhale 2 puffs into the lungs every 6 (six) hours as needed for Wheezing.    amitriptyline (ELAVIL) 10 MG tablet TAKE 1 TABLET (10 MG TOTAL) BY MOUTH NIGHTLY AS NEEDED FOR INSOMNIA.    amlodipine-benazepril 5-20 mg (LOTREL) 5-20 mg per capsule Take 1 capsule by mouth every evening.     blood sugar diagnostic (FREESTYLE INSULINX TEST STRIPS) Strp 1 strip by Misc.(Non-Drug; Combo Route) route daily as needed. For Freestyle Lite Glucometer    blood-glucose meter (FREESTYLE SYSTEM KIT) kit Use as instructed - Freestyle Lite Glucometer    cetirizine (ZYRTEC) 5 MG tablet Take 1 tablet (5 mg total) by mouth once daily.    CYANOCOBALAMIN, VITAMIN B-12, (VITAMIN B-12 ORAL) Take 1 capsule by mouth once daily.    desonide (DESOWEN) 0.05 % cream Apply topically 2 (two) times daily. No longer than 2 weeks    dicyclomine (BENTYL) 10 MG capsule TAKE 1 CAPSULE (10 MG TOTAL) BY MOUTH 3 (THREE) TIMES DAILY.    fluticasone (FLONASE) 50 mcg/actuation nasal spray 2 sprays by Each Nare route daily as needed for Rhinitis.    gabapentin (NEURONTIN) 300 MG capsule TAKE 1 CAPSULE ON FIRST DAY THEN 1 CAPSULE TWICE A DAY FOR 1 DAY THEN AS DIRECTED    lancets (ONETOUCH DELICA LANCETS) 33 gauge Misc 1 lancet by Misc.(Non-Drug; Combo Route) route daily as needed. For  Freestyle Lite Glucometer    metformin (GLUCOPHAGE) 500 MG tablet TAKE 2 TABLETS (1,000 MG TOTAL) BY MOUTH 2 (TWO) TIMES DAILY WITH MEALS.    methylPREDNISolone (MEDROL DOSEPACK) 4 mg tablet use as directed    montelukast (SINGULAIR) 10 mg tablet TAKE 1 TABLET (10 MG TOTAL) BY MOUTH EVERY EVENING.    MULTIVITAMIN W-MINERALS/LUTEIN (CENTRUM SILVER ORAL) Take by mouth once daily.    nabumetone (RELAFEN) 500 MG tablet Take 1 tablet (500 mg total) by mouth 2 (two) times daily.    pantoprazole (PROTONIX) 40 MG tablet TAKE 1 TABLET (40 MG TOTAL) BY MOUTH ONCE DAILY.    PYRIDOXINE HCL (VITAMIN B-6 ORAL) Take 1 tablet by mouth once daily.    sertraline (ZOLOFT) 50 MG tablet TAKE 1 TABLET (50 MG TOTAL) BY MOUTH ONCE DAILY.    tramadol (ULTRAM) 50 mg tablet TAKE 1 TABLET (50 MG TOTAL) BY MOUTH EVERY 6 (SIX) HOURS AS NEEDED.           Clinical Reference Information           Your Vitals Were     BP Pulse Temp Resp Weight Last Period    124/62 82 96.6 °F (35.9 °C) (Tympanic) 18 80.3 kg (177 lb 0.5 oz) (Exact Date)    SpO2 BMI             98% 32.91 kg/m2         Blood Pressure          Most Recent Value    BP  124/62      Allergies as of 2/9/2017     Bactrim [Sulfamethoxazole-trimethoprim]    Iodinated Contrast Media - Iv Dye      Immunizations Administered on Date of Encounter - 2/9/2017     Name Date Dose VIS Date Route    Pneumococcal Conjugate - 13 Valent  Incomplete 0.5 mL 11/5/2015 Intramuscular      Orders Placed During Today's Visit      Normal Orders This Visit    Pneumococcal Conjugate Vaccine (13 Valent) (IM)     Future Labs/Procedures Expected by Expires    Comprehensive metabolic panel  2/9/2017 4/10/2018    Hemoglobin A1c  2/9/2017 4/10/2018    Lipid panel  2/9/2017 4/10/2018    Mammo Digital Screening Bilat with CAD  2/9/2017 4/11/2018    Protein / creatinine ratio, urine  2/9/2017 4/10/2018    TSH  2/9/2017 4/10/2018      Instructions    Please correspond with Dr. Whaley via My Chart for your  results.     Thanks.       Language Assistance Services     ATTENTION: Language assistance services are available, free of charge. Please call [phone].      ATENCIÓN: Si habla ludin, tiene a mims disposición servicios gratuitos de asistencia lingüística. Llame al [phone].     CHÚ Ý: N?u b?n nói Ti?ng Vi?t, có các d?ch v? h? tr? ngôn ng? mi?n phí dành cho b?n. G?i s? [phone].         [facility] complies with applicable Federal civil rights laws and does not discriminate on the basis of race, color, national origin, age, disability, or sex.

## 2017-02-09 NOTE — PROGRESS NOTES
HISTORY OF PRESENT ILLNESS: Ms. Caro comes in today non fasting and without taking medication (as she takes medication at night) for annual wellness examination.     END OF LIFE DECISION: She does not have a living will and is unsure about life support.     Current Outpatient Prescriptions   Medication Sig    albuterol 90 mcg/actuation inhaler Inhale 2 puffs into the lungs every 6 (six) hours as needed for Wheezing.    amitriptyline (ELAVIL) 10 MG tablet TAKE 1 TABLET (10 MG TOTAL) BY MOUTH NIGHTLY AS NEEDED FOR INSOMNIA.    amlodipine-benazepril 5-20 mg (LOTREL) 5-20 mg per capsule Take 1 capsule by mouth every evening.     blood sugar diagnostic (FREESTYLE INSULINX TEST STRIPS) Strp 1 strip by Misc.(Non-Drug; Combo Route) route daily as needed. For Freestyle Lite Glucometer    blood-glucose meter (FREESTYLE SYSTEM KIT) kit Use as instructed - Freestyle Lite Glucometer    cetirizine (ZYRTEC) 5 MG tablet Take 1 tablet (5 mg total) by mouth once daily. (Patient taking differently: Take 5 mg by mouth daily as needed. )    CYANOCOBALAMIN, VITAMIN B-12, (VITAMIN B-12 ORAL) Take 1 capsule by mouth once daily.    desonide (DESOWEN) 0.05 % cream Apply topically 2 (two) times daily. No longer than 2 weeks    dicyclomine (BENTYL) 10 MG capsule TAKE 1 CAPSULE (10 MG TOTAL) BY MOUTH 3 (THREE) TIMES DAILY.    fluticasone (FLONASE) 50 mcg/actuation nasal spray 2 sprays by Each Nare route daily as needed for Rhinitis.    gabapentin (NEURONTIN) 300 MG capsule TAKE 1 CAPSULE ON FIRST DAY THEN 1 CAPSULE TWICE A DAY FOR 1 DAY THEN AS DIRECTED    lancets (ONETOUCH DELICA LANCETS) 33 gauge Misc 1 lancet by Misc.(Non-Drug; Combo Route) route daily as needed. For Freestyle Lite Glucometer    metformin (GLUCOPHAGE) 500 MG tablet TAKE 2 TABLETS (1,000 MG TOTAL) BY MOUTH 2 (TWO) TIMES DAILY WITH MEALS.    methylPREDNISolone (MEDROL DOSEPACK) 4 mg tablet use as directed    montelukast (SINGULAIR) 10 mg tablet TAKE 1 TABLET  (10 MG TOTAL) BY MOUTH EVERY EVENING.    MULTIVITAMIN W-MINERALS/LUTEIN (CENTRUM SILVER ORAL) Take by mouth once daily.    nabumetone (RELAFEN) 500 MG tablet Take 1 tablet (500 mg total) by mouth 2 (two) times daily.    pantoprazole (PROTONIX) 40 MG tablet TAKE 1 TABLET (40 MG TOTAL) BY MOUTH ONCE DAILY.    PYRIDOXINE HCL (VITAMIN B-6 ORAL) Take 1 tablet by mouth once daily.    sertraline (ZOLOFT) 50 MG tablet TAKE 1 TABLET (50 MG TOTAL) BY MOUTH ONCE DAILY.    tramadol (ULTRAM) 50 mg tablet TAKE 1 TABLET (50 MG TOTAL) BY MOUTH EVERY 6 (SIX) HOURS AS NEEDED.     SCREENINGS:   Cholesterol: February 17, 2016.  FFS/Colonoscopy: January 8, 2014 - internal hemorrhoids, diverticulosis, benign colon polyp; repeat in 5 years.  Mammogram: February 17, 2016 - okay.   GYN Exam (breast): February 9, 2016 - okay.   Dexa Scan: July 17, 2014 - osteopenia with low fracture risk; repeat in 5 years.   Eye Exam: July 24, 2016 with Dr. Boyd.   Dental Exam: Wears dentures top and bottom.  PPD: Negative in the past.  Immunizations: Td/Tdap - Unsure. Advised patient insurance covered benefit only if injury.  Gardasil - N./A.  Zostavax - Never; reports history of varicella, not zoster. Advised insurance covered benefit at local pharmacy.  Pneumovax - Since age 65 per patient.  Prevnar-13 shot - Never. She desires.  Seasonal Flu - December 16, 2016.     ROS:  GENERAL: Denies fever, chills or unusual weight change. Reports appetite changes some times. Weight 78.7 kg (173 lb 8 oz) at Jennyfer 3, 2016 visit. Exercises some/walks driveway limited due to foot pain. Monitors diet. Reports fatigue associated with anemia.  SKIN: Denies rashes, itching, changes in mole, color or texture of skin or easy bruising.   HEAD: Denies headaches or recent head trauma.  EYES: Denies change in vision, pain, diplopia, redness or discharge.  EARS: Denies ear pain, discharge, vertigo or decreased hearing.  NOSE: Denies loss of smell, epistaxis or  rhinitis.  MOUTH & THROAT: Denies hoarseness or change in voice. Denies excessive gum bleeding or mouth sores. Denies sore throat.  NODES: Denies swollen glands.  CHEST: Denies JAMES, wheezing, cough, or sputum production.  CARDIOVASCULAR: Denies chest pain, PND, orthopnea or reduced exercise tolerance. Denies palpitations.  ABDOMEN: Denies diarrhea, constipation, nausea, vomiting, abdominal pain, or blood in stool.  URINARY: Denies flank pain, dysuria or hematuria. Saw Dr. Tillman, urologist, on February 17, 2016 for many renal cysts with 1-year follow up visit and ultrasound scheduled for February 17, 2017.  GENITOURINARY: Denies flank pain, dysuria, frequency or hematuria. Performs monthly breast self examination.  ENDOCRINE: Does not perform home glucose checks as reports she has no glucometer.  HEME/LYMPH: Denies bleeding problems. Follows with Dr. Mccoy, hematologist, for refractory iron-deficient anemia, myelodysplastic syndrome with last visit on January 12, 2017; states receives Procrit weekly and scheduled February 15, 2017 for follow up.   PERIPHERAL VASCULAR:Denies claudication or cyanosis.  MUSCULOSKELETAL: Denies joint pain, stiffness or swelling except reports right lower leg pain but follows with Dr. Washington, podiatrist, for Posterior tibial tendon dysfunction - Right Foot with last visit on January 10, 2017 and follow up scheduled for February 15, 2017. Wears boot/brace now.  NEUROLOGIC: Denies history of seizures, tremors, paralysis, alteration of gait or coordination.  PSYCHIATRIC: Denies mood swings, depression, anxiety, homicidal or suicidal thoughts. Denies sleep problems.    PE:   VS:   Visit Vitals    /62    Pulse 82    Temp 96.6 °F (35.9 °C) (Tympanic)    Resp 18    Wt 80.3 kg (177 lb 0.5 oz)    LMP  (Exact Date)    SpO2 98%    BMI 32.91 kg/m2     APPEARANCE: Well nourished, well developed female, elderly and pleasant, obese, alert and oriented in no acute distress.   HEAD:  Nontender. Full range of motion.  EYES: PERRL, conjunctiva pale, lids no edema.  EARS: External canal patent, no swelling or redness. TM's shiny and clear.  NOSE: Mucosa and turbinates pink, not swollen. No discharge. Nontender sinuses.  THROAT: No pharyngeal erythema or exudate. No stridor.   NECK: Supple, no mass, thyroid not enlarged.  NODES: No cervical, axillary lymph node enlargement.  CHEST: Normal respiratory effort. Lungs clear to auscultation.  CARDIOVASCULAR: Normal S1, S2. No rubs, murmurs or gallops. PMI not displaced. No carotid bruit. Pedal pulses palpable at left as noted below. No edema. Left foot okay without ulcerations or skin breaks.  ABDOMEN: Bowel sounds present. Not distended. Soft. No tenderness, masses or organomegaly.   BREAST EXAM: Symmetrical, no external lesions, no discharge, no masses palpated.  PELVIC EXAM: Not examined as patient as has TAHBSO due to non cancerous reasons.  RECTAL EXAM: Not examined as patient declines.  MUSCULOSKELETAL: No joint deformities or stiffness today. She is ambulatory without problems.  SKIN: No rashes or suspicious lesions, normal color and turgor. No skin warmth, redness, swelling at right upper arm.  NEUROLOGIC: Cranial Nerves: II-XII grossly intact. DTR's: Knees, Ankles 2+ and equal bilaterally. Gait & Posture: Normal gait and fine motion. Monofilament test unremarkable at left as noted below.  PSYCHIATRIC: Patient alert, oriented x 3. Mood/Affect normal without acute anxiety or depression noted. Judgment/insight good as she makes appropriate decisions during today's examination.    Protective Sensation (w/ 10 gram monofilament):  Right: Not examined today as she wears brace.  Left: Intact    Visual Inspection:  Normal -  left; however, right foot not visualized as she wears brace.    Pedal Pulses:   Right: Not examined today.  Left: Present    Posterior tibialis:   Right: Not examined today.  Left: Present    Lab Results   Component Value Date     HGBA1C 5.8 12/16/2016     Lab Results   Component Value Date    WBC 7.81 01/10/2017    HGB 7.4 (L) 01/10/2017    HCT 21.9 (L) 01/10/2017    MCV 83 01/10/2017     01/10/2017       ASSESSMENT:    ICD-10-CM ICD-9-CM    1. Annual physical exam Z00.00 V70.0    2. Hypertension associated with diabetes E11.59 250.80 TSH    I10 401.9 Lipid panel      Comprehensive metabolic panel   3. Type 2 diabetes mellitus without complication, without long-term current use of insulin E11.9 250.00 Hemoglobin A1c      Protein / creatinine ratio, urine   4. Refractory anemia D46.4 238.72    5. MDS (myelodysplastic syndrome) D46.9 238.75    6. Benign colon polyp K63.5 211.3    7. DDD (degenerative disc disease), lumbar M51.36 722.52    8. Recurrent major depressive disorder, remission status unspecified F33.9 296.30    9. Diverticulitis of intestine without perforation or abscess without bleeding, unspecified part of intestinal tract K57.92 562.11    10. Obesity (BMI 30.0-34.9) E66.9 278.00    11. Osteopenia M85.80 733.90    12. Postmenopausal Z78.0 V49.81    13. Need for prophylactic vaccination against Streptococcus pneumoniae (pneumococcus) Z23 V03.82 Pneumococcal Conjugate Vaccine (13 Valent) (IM)   14. Other screening mammogram Z12.31 V76.12 Mammo Digital Screening Bilat with CAD       PLAN:   1. Age-appropriate counseling-appropriate low-sodium, low-cholesterol, low carbohydrate diet and exercise daily, monthly breast self exam, annual wellness examination.  2. Patient advised to correspond via My Chart for results.  3. Continue current medications.  4. Keep follow up with specialists.  5. See me in 6 months for blood pressure and diabetes follow up.

## 2017-02-15 ENCOUNTER — OFFICE VISIT (OUTPATIENT)
Dept: HEMATOLOGY/ONCOLOGY | Facility: CLINIC | Age: 72
End: 2017-02-15
Payer: MEDICARE

## 2017-02-15 ENCOUNTER — TELEPHONE (OUTPATIENT)
Dept: HEMATOLOGY/ONCOLOGY | Facility: CLINIC | Age: 72
End: 2017-02-15

## 2017-02-15 ENCOUNTER — INFUSION (OUTPATIENT)
Dept: INFUSION THERAPY | Facility: HOSPITAL | Age: 72
End: 2017-02-15
Attending: INTERNAL MEDICINE
Payer: MEDICARE

## 2017-02-15 VITALS
RESPIRATION RATE: 18 BRPM | WEIGHT: 173.5 LBS | BODY MASS INDEX: 30.74 KG/M2 | BODY MASS INDEX: 31.23 KG/M2 | SYSTOLIC BLOOD PRESSURE: 158 MMHG | DIASTOLIC BLOOD PRESSURE: 90 MMHG | WEIGHT: 173.5 LBS | HEART RATE: 76 BPM | HEIGHT: 63 IN | TEMPERATURE: 98 F

## 2017-02-15 DIAGNOSIS — D46.4 REFRACTORY ANEMIA: ICD-10-CM

## 2017-02-15 DIAGNOSIS — D46.9 MDS (MYELODYSPLASTIC SYNDROME): Primary | ICD-10-CM

## 2017-02-15 PROCEDURE — 99499 UNLISTED E&M SERVICE: CPT | Mod: S$GLB,,, | Performed by: INTERNAL MEDICINE

## 2017-02-15 PROCEDURE — 99214 OFFICE O/P EST MOD 30 MIN: CPT | Mod: 25,S$GLB,, | Performed by: INTERNAL MEDICINE

## 2017-02-15 PROCEDURE — 96372 THER/PROPH/DIAG INJ SC/IM: CPT | Mod: PO

## 2017-02-15 PROCEDURE — 63600175 PHARM REV CODE 636 W HCPCS: Mod: PO | Performed by: INTERNAL MEDICINE

## 2017-02-15 RX ADMIN — ERYTHROPOIETIN 20000 UNITS: 20000 INJECTION, SOLUTION INTRAVENOUS; SUBCUTANEOUS at 01:02

## 2017-02-15 NOTE — MR AVS SNAPSHOT
Patient Information     Patient Name Sex     Adelina Caro Female 1945      Visit Information        Provider Department Dept Phone Center    2/15/2017 2:30 PM [facility] Chemo Infusion [facility] Chemotherapy Infusion [phone] [facility]      Patient Instructions     None      Your Current Medications Are     albuterol 90 mcg/actuation inhaler    amitriptyline (ELAVIL) 10 MG tablet    amlodipine-benazepril 5-20 mg (LOTREL) 5-20 mg per capsule    blood sugar diagnostic (FREESTYLE INSULINX TEST STRIPS) Strp    blood-glucose meter (FREESTYLE SYSTEM KIT) kit    cetirizine (ZYRTEC) 5 MG tablet    CYANOCOBALAMIN, VITAMIN B-12, (VITAMIN B-12 ORAL)    desonide (DESOWEN) 0.05 % cream    dicyclomine (BENTYL) 10 MG capsule    fluticasone (FLONASE) 50 mcg/actuation nasal spray    gabapentin (NEURONTIN) 300 MG capsule    lancets (ONETOUCH DELICA LANCETS) 33 gauge Misc    metformin (GLUCOPHAGE) 500 MG tablet    methylPREDNISolone (MEDROL DOSEPACK) 4 mg tablet    montelukast (SINGULAIR) 10 mg tablet    MULTIVITAMIN W-MINERALS/LUTEIN (CENTRUM SILVER ORAL)    nabumetone (RELAFEN) 500 MG tablet    pantoprazole (PROTONIX) 40 MG tablet    PYRIDOXINE HCL (VITAMIN B-6 ORAL)    sertraline (ZOLOFT) 50 MG tablet    tramadol (ULTRAM) 50 mg tablet      Facility-Administered Medications     epoetin teresa injection 20,000 Units      Appointments for Next Year     2/15/2017  2:00 PM ESTABLISHED PATIENT (20 min.) [facility] - Hemotology Oncology Gordon Mccoy MD    Arrive at check-in approximately 15 minutes before your scheduled appointment time. Bring all outside medical records and imaging, along with a list of your current medications and insurance card.    (off [facility]) 3rd Floor    2/15/2017  2:30 PM INFUSION 015 MIN (15 min.) [facility] - [address]    Arrive at check-in approximately 15 minutes before your scheduled appointment time. Bring all outside medical records and imaging, along with a list of your  current medications and insurance card.    2/23/2017  8:35 AM FASTING LAB (5 min.) [facility]AT[facility]    1. Do not eat or drink anything for TEN HOURS (10) PRIOR TO TEST. Do not chew gum or eat candy mints, even those claiming to be sugar free. Water is allowed but do not drink any other fluids 2. Take your regular daily medicines as your doctor has ordered. If you are diabetic, do not take your insulin or other diabetic medication until your blood is drawn and you are ready to eat. Your physician may have special instructions for diabetics. Check with your doctor if you have any questions.3. Alcoholic beverages are not allowed starting at 6:00pm the evening before your appointment.    (off [address]) 2nd floor    2/23/2017  9:15 AM MAMMO SCREENING (15 min.) [facility] MAMMO1-SCR    Please do not wear deodorant, powder, ointment, or skin product under the arm or on the breast the day of the test and wear a 2 piece outfit (no dresses). Please also bring any outside mammogram films on day of appointment.    (off [employer] [address]) 4th floor    2/23/2017 10:10 AM URINE (10 min.) [facility]    Arrive at check-in approximately 15 minutes before your scheduled appointment time. Bring all outside medical records and imaging, along with a list of your current medications and insurance card.    2/28/2017  9:30 AM INFUSION 015 MIN (15 min.) [facility] CHAIR [facility]    Arrive at check-in approximately 15 minutes before your scheduled appointment time. Bring all outside medical records and imaging, along with a list of your current medications and insurance card.    3/8/2017  1:00 PM ESTABLISHED PATIENT (20 min.) [facility] - Urology Hardik Rubio IV, MD    Arrive at check-in approximately 15 minutes before your scheduled appointment time. Bring all outside medical records and imaging, along with a list of your  "current medications and insurance card.    (off [facility]) 2nd floor    3/21/2017  9:30 AM NON FASTING LAB (5 min.) [facility] LABORATORY, [facility]    Arrive at check-in approximately 15 minutes before your scheduled appointment time. Bring all outside medical records and imaging, along with a list of your current medications and insurance card.    (off [address]) 2nd floor    3/21/2017 10:00 AM ESTABLISHED PATIENT (20 min.) [facility] Hemotology Oncology Gordon Mccoy MD    Arrive at check-in approximately 15 minutes before your scheduled appointment time. Bring all outside medical records and imaging, along with a list of your current medications and insurance card.    (off [address]) 3rd Floor    3/21/2017 10:30 AM INFUSION 015 MIN (15 min.) [facility]AIR [facility]    Arrive at check-in approximately 15 minutes before your scheduled appointment time. Bring all outside medical records and imaging, along with a list of your current medications and insurance card.    8/9/2017  8:45 AM ESTABLISHED PATIENT (15 min.) [facility] Shayla Whaley MD    Arrive at check-in approximately 15 minutes before your scheduled appointment time. Bring all outside medical records and imaging, along with a list of your current medications and insurance card.         Default Flowsheet Data (last 24 hours)      Amb Complex Vitals Michi        02/15/17 1341 02/15/17 1326             Measurements    Weight 78.7 kg (173 lb 8 oz) 78.7 kg (173 lb 8 oz)       Height  5' 2.5" (1.588 m)       BSA (Calculated - sq m)  1.86 sq meters       BMI (Calculated)  31.3       BP  (!)  158/90       Temp  98.3 °F (36.8 °C)       Pulse  76       Resp  18       Pain Assessment    Pain Score Zero Zero               Allergies     Bactrim [Sulfamethoxazole-trimethoprim]     Hospitalized due to acute kidney injury,encephalopathy  And hyponatremia    Iodinated Contrast Media - Iv Dye Anaphylaxis "      Current Discharge Medication List     Cannot display discharge medications since this is not an admission.

## 2017-02-15 NOTE — PROGRESS NOTES
Subjective:       Patient ID: Adelina Caro is a 71 y.o. female.    Chief Complaint: Follow-up; Results; and Anemia    HPI 71-year-old female history of myelodysplasia patient returns for evaluation response to Procrit hemoglobin is increased from 7.4-8.6 after one week    Past Medical History   Diagnosis Date    Anemia     Anxiety     Arthritis     Asthma     Back pain     Benign colonic polyp     Bulging disc      low back    Cervicalgia 9/17/2013    DDD (degenerative disc disease), lumbar     Depression     Diabetes mellitus      borderline    Diverticulosis     Diverticulosis     Hiatal hernia 3/10/2015     S/P hernia repair x  2    Hypertension     Pneumonia     Polyneuropathy     Postmenopausal      no history of abnormal pap smear    Right rotator cuff tear      right shoulder    Type 2 diabetes mellitus without complication      Family History   Problem Relation Age of Onset    Colon cancer Sister     Colon cancer Sister     Ovarian cancer Sister     Hypertension Mother     Heart disease Mother      MI    Breast cancer Mother     Cataracts Mother     Diabetes Sister     Cancer Sister      unknown    Heart disease Sister      MI/CAD    Stroke Neg Hx      Social History     Social History    Marital status:      Spouse name: N/A    Number of children: 3    Years of education: N/A     Occupational History    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    [employer]     Social History Main Topics    Smoking status: Former Smoker     Packs/day: 1.00     Years: 3.00     Types: Cigarettes     Quit date: 8/22/1985    Smokeless tobacco: Never Used    Alcohol use No    Drug use: No    Sexual activity: No     Other Topics Concern    Not on file     Social History Narrative    She wears seatbelt.     Past Surgical History   Procedure Laterality Date    Hernia surgery       left lower abd wall;  mesh used    Tubal ligation      Hernia repair       Total abdominal hysterectomy w/ bilateral salpingoophorectomy      Bladder surgery      Colonoscopy      Bone marrow biopsy         Labs:  Lab Results   Component Value Date    WBC 5.84 02/15/2017    HGB 8.6 (L) 02/15/2017    HCT 25.8 (L) 02/15/2017    MCV 85 02/15/2017     02/15/2017     BMP  Lab Results   Component Value Date     12/16/2016    K 4.6 12/16/2016     12/16/2016    CO2 26 12/16/2016    BUN 15 12/16/2016    CREATININE 0.8 12/16/2016    CALCIUM 9.4 12/16/2016    ANIONGAP 8 12/16/2016    ESTGFRAFRICA >60.0 12/16/2016    EGFRNONAA >60.0 12/16/2016     Lab Results   Component Value Date    ALT 18 05/27/2016    AST 27 05/27/2016    ALKPHOS 50 (L) 05/27/2016    BILITOT 0.5 05/27/2016       Lab Results   Component Value Date    IRON 58 01/12/2017    TIBC 272 01/12/2017    FERRITIN 1475 (H) 01/12/2017     Lab Results   Component Value Date    TGZRKEYT05 537 01/12/2017     No results found for: FOLATE  Lab Results   Component Value Date    TSH 1.021 02/17/2016         Review of Systems   Constitutional: Positive for fatigue. Negative for activity change, appetite change, chills, diaphoresis, fever and unexpected weight change.   HENT: Negative for congestion, dental problem, drooling, ear discharge, ear pain, facial swelling, hearing loss, mouth sores, nosebleeds, postnasal drip, rhinorrhea, sinus pressure, sneezing, sore throat, tinnitus, trouble swallowing and voice change.    Eyes: Negative for photophobia, pain, discharge, redness, itching and visual disturbance.   Respiratory: Negative for cough, choking, chest tightness, shortness of breath, wheezing and stridor.    Cardiovascular: Negative for chest pain, palpitations and leg swelling.   Gastrointestinal: Negative for abdominal distention, abdominal pain, anal bleeding, blood in stool, constipation, diarrhea, nausea, rectal pain and vomiting.   Endocrine: Negative for cold intolerance, heat intolerance, polydipsia, polyphagia and  polyuria.   Genitourinary: Negative for decreased urine volume, difficulty urinating, dyspareunia, dysuria, enuresis, flank pain, frequency, genital sores, hematuria, menstrual problem, pelvic pain, urgency, vaginal bleeding, vaginal discharge and vaginal pain.   Musculoskeletal: Negative for arthralgias, back pain, gait problem, joint swelling, myalgias, neck pain and neck stiffness.   Skin: Negative for color change, pallor and rash.   Allergic/Immunologic: Negative for environmental allergies, food allergies and immunocompromised state.   Neurological: Positive for weakness. Negative for dizziness, tremors, seizures, syncope, facial asymmetry, speech difficulty, light-headedness, numbness and headaches.   Hematological: Negative for adenopathy. Does not bruise/bleed easily.   Psychiatric/Behavioral: Negative for agitation, behavioral problems, confusion, decreased concentration, dysphoric mood, hallucinations, self-injury, sleep disturbance and suicidal ideas. The patient is not nervous/anxious and is not hyperactive.        Objective:      Physical Exam   Constitutional: She is oriented to person, place, and time. She appears well-developed and well-nourished. No distress.   HENT:   Head: Normocephalic and atraumatic.   Right Ear: External ear normal.   Left Ear: External ear normal.   Nose: Nose normal. Right sinus exhibits no maxillary sinus tenderness and no frontal sinus tenderness. Left sinus exhibits no maxillary sinus tenderness and no frontal sinus tenderness.   Mouth/Throat: Oropharynx is clear and moist. No oropharyngeal exudate.   Eyes: Conjunctivae, EOM and lids are normal. Pupils are equal, round, and reactive to light. Right eye exhibits no discharge. Left eye exhibits no discharge. Right conjunctiva is not injected. Right conjunctiva has no hemorrhage. Left conjunctiva is not injected. Left conjunctiva has no hemorrhage. No scleral icterus.   Neck: Normal range of motion. Neck supple. No JVD  present. No tracheal deviation present. No thyromegaly present.   Cardiovascular: Normal rate and regular rhythm.    Pulmonary/Chest: Effort normal. No stridor. No respiratory distress. She exhibits no tenderness.   Abdominal: Soft. She exhibits no distension and no mass. There is no splenomegaly or hepatomegaly. There is no tenderness. There is no rebound.   Musculoskeletal: Normal range of motion. She exhibits no edema or tenderness.   Lymphadenopathy:     She has no cervical adenopathy.     She has no axillary adenopathy.        Right: No supraclavicular adenopathy present.        Left: No supraclavicular adenopathy present.   Neurological: She is alert and oriented to person, place, and time. No cranial nerve deficit. Coordination normal.   Skin: Skin is dry. No rash noted. She is not diaphoretic. No erythema.   Psychiatric: She has a normal mood and affect. Her behavior is normal. Judgment and thought content normal.   Vitals reviewed.          Assessment:       1. MDS (myelodysplastic syndrome)    2. Refractory anemia            Plan:         intravenous improved hemoglobin from 7.4-8.6 continue with Procrit 20,000 units every 14 days return in 4-6 weeks with repeat CBC retake and iron status

## 2017-02-16 DIAGNOSIS — J32.9 SINOBRONCHITIS: ICD-10-CM

## 2017-02-16 DIAGNOSIS — J40 SINOBRONCHITIS: ICD-10-CM

## 2017-02-16 RX ORDER — ALBUTEROL SULFATE 90 UG/1
2 AEROSOL, METERED RESPIRATORY (INHALATION) EVERY 6 HOURS PRN
Qty: 18 G | Refills: 5 | Status: SHIPPED | OUTPATIENT
Start: 2017-02-16 | End: 2017-02-17 | Stop reason: SDUPTHER

## 2017-02-17 DIAGNOSIS — J32.9 SINOBRONCHITIS: ICD-10-CM

## 2017-02-17 DIAGNOSIS — J40 SINOBRONCHITIS: ICD-10-CM

## 2017-02-17 RX ORDER — ALBUTEROL SULFATE 90 UG/1
2 AEROSOL, METERED RESPIRATORY (INHALATION) EVERY 6 HOURS PRN
Qty: 18 G | Refills: 5 | Status: SHIPPED | OUTPATIENT
Start: 2017-02-17 | End: 2018-02-17

## 2017-02-17 NOTE — TELEPHONE ENCOUNTER
----- Message from Kareen Grubbs sent at 2/17/2017 12:17 PM CST -----  Contact: Patient  Patient called and stated her pharmacy sent a request for her inhaler and she said she really needs it.    1. Albuterol    [id number] - [facility] [address] [address]  Phone: [phone] Fax: [phone]    She can be contacted at [phone].    Thanks,  Kareen

## 2017-02-23 ENCOUNTER — HOSPITAL ENCOUNTER (OUTPATIENT)
Dept: RADIOLOGY | Facility: HOSPITAL | Age: 72
Discharge: HOME | End: 2017-02-23
Attending: FAMILY MEDICINE
Payer: MEDICARE

## 2017-02-23 DIAGNOSIS — Z12.31 OTHER SCREENING MAMMOGRAM: ICD-10-CM

## 2017-02-23 PROCEDURE — 77067 SCR MAMMO BI INCL CAD: CPT | Mod: TC

## 2017-02-23 PROCEDURE — 77063 BREAST TOMOSYNTHESIS BI: CPT | Mod: 26,,, | Performed by: RADIOLOGY

## 2017-02-23 PROCEDURE — 77067 SCR MAMMO BI INCL CAD: CPT | Mod: 26,,, | Performed by: RADIOLOGY

## 2017-03-02 ENCOUNTER — INFUSION (OUTPATIENT)
Dept: INFUSION THERAPY | Facility: HOSPITAL | Age: 72
End: 2017-03-02
Attending: INTERNAL MEDICINE
Payer: MEDICARE

## 2017-03-02 VITALS — HEART RATE: 70 BPM | SYSTOLIC BLOOD PRESSURE: 129 MMHG | DIASTOLIC BLOOD PRESSURE: 70 MMHG | TEMPERATURE: 97 F

## 2017-03-02 DIAGNOSIS — D46.9 MDS (MYELODYSPLASTIC SYNDROME): Primary | ICD-10-CM

## 2017-03-02 DIAGNOSIS — D46.4 REFRACTORY ANEMIA: ICD-10-CM

## 2017-03-02 PROCEDURE — 63600175 PHARM REV CODE 636 W HCPCS: Mod: PO | Performed by: INTERNAL MEDICINE

## 2017-03-02 PROCEDURE — 96372 THER/PROPH/DIAG INJ SC/IM: CPT | Mod: PO

## 2017-03-02 RX ADMIN — ERYTHROPOIETIN 20000 UNITS: 20000 INJECTION, SOLUTION INTRAVENOUS; SUBCUTANEOUS at 09:03

## 2017-03-02 NOTE — MR AVS SNAPSHOT
Patient Information     Patient Name Sex Adelina Stephenson Female 1945      Visit Information        Provider Department Dept Phone Center    3/2/2017 9:30 AM [facility] Chemo Infusion [facility] Chemotherapy Infusion [phone] [facility]      Patient Instructions      [facility] [place]  [address]  [address]  [phone] phone     [phone] fax  Hours of Operation: Monday- Friday 8:00am- 5:00pm  After hours phone  [phone]  Hematology / Oncology Physicians on call      Dr. Darius Pitts    Please call with any concerns regarding your appointment today.       Your Current Medications Are     albuterol 90 mcg/actuation inhaler    amitriptyline (ELAVIL) 10 MG tablet    amlodipine-benazepril 5-20 mg (LOTREL) 5-20 mg per capsule    blood sugar diagnostic (FREESTYLE INSULINX TEST STRIPS) Strp    blood-glucose meter (FREESTYLE SYSTEM KIT) kit    CYANOCOBALAMIN, VITAMIN B-12, (VITAMIN B-12 ORAL)    desonide (DESOWEN) 0.05 % cream    dicyclomine (BENTYL) 10 MG capsule    fluticasone (FLONASE) 50 mcg/actuation nasal spray    gabapentin (NEURONTIN) 300 MG capsule    lancets (ONETOUCH DELICA LANCETS) 33 gauge Misc    metformin (GLUCOPHAGE) 500 MG tablet    methylPREDNISolone (MEDROL DOSEPACK) 4 mg tablet    montelukast (SINGULAIR) 10 mg tablet    MULTIVITAMIN W-MINERALS/LUTEIN (CENTRUM SILVER ORAL)    nabumetone (RELAFEN) 500 MG tablet    pantoprazole (PROTONIX) 40 MG tablet    PYRIDOXINE HCL (VITAMIN B-6 ORAL)    sertraline (ZOLOFT) 50 MG tablet    tramadol (ULTRAM) 50 mg tablet    cetirizine (ZYRTEC) 5 MG tablet      Facility-Administered Medications     epoetin teresa injection 20,000 Units      Appointments for Next Year     3/8/2017  1:00 PM ESTABLISHED PATIENT (20 min.) [facility] Hardik Rubio IV, MD    Arrive at check-in approximately 15 minutes before your scheduled appointment time. Bring all outside medical  records and imaging, along with a list of your current medications and insurance card.    (off [facility]) 2nd floor    3/21/2017  9:30 AM NON FASTING LAB (5 min.) [facility] LABORATORY, [facility]    Arrive at check-in approximately 15 minutes before your scheduled appointment time. Bring all outside medical records and imaging, along with a list of your current medications and insurance card.    (off [address]) 2nd floor    3/21/2017 10:00 AM ESTABLISHED PATIENT (20 min.) [facility] Hemotology Oncology Gordon Mccoy MD    Arrive at check-in approximately 15 minutes before your scheduled appointment time. Bring all outside medical records and imaging, along with a list of your current medications and insurance card.    (off [address]) 3rd Floor    3/21/2017 10:30 AM INFUSION 015 MIN (15 min.) [facility]AIR [facility]    Arrive at check-in approximately 15 minutes before your scheduled appointment time. Bring all outside medical records and imaging, along with a list of your current medications and insurance card.    8/9/2017  8:45 AM ESTABLISHED PATIENT (15 min.) [facility] Shayla Whaley MD    Arrive at check-in approximately 15 minutes before your scheduled appointment time. Bring all outside medical records and imaging, along with a list of your current medications and insurance card.         Default Flowsheet Data (last 24 hours)      Amb Complex Vitals [facility]        03/02/17 0941                Measurements    /70        Temp 97.4 °F (36.3 °C)        Pulse 70        Pain Assessment    Pain Score Zero                Allergies     Bactrim [Sulfamethoxazole-trimethoprim]     Hospitalized due to acute kidney injury,encephalopathy  And hyponatremia    Iodinated Contrast Media - Iv Dye Anaphylaxis      Current Discharge Medication List     Cannot display discharge medications since this is not an admission.

## 2017-03-02 NOTE — PATIENT INSTRUCTIONS
[facility]  [address]  [address] [address]  [phone] phone     [phone] fax  Hours of Operation: Monday- Friday 8:00am- 5:00pm  After hours phone  [phone]  Hematology / Oncology Physicians on call      Dr. Darius Pitts    Please call with any concerns regarding your appointment today.

## 2017-03-08 ENCOUNTER — OFFICE VISIT (OUTPATIENT)
Dept: UROLOGY | Facility: CLINIC | Age: 72
End: 2017-03-08
Payer: MEDICARE

## 2017-03-08 VITALS
BODY MASS INDEX: 31.55 KG/M2 | WEIGHT: 175.25 LBS | DIASTOLIC BLOOD PRESSURE: 62 MMHG | SYSTOLIC BLOOD PRESSURE: 132 MMHG | HEART RATE: 68 BPM

## 2017-03-08 DIAGNOSIS — N28.1 RENAL CYST: Primary | ICD-10-CM

## 2017-03-08 PROCEDURE — 1160F RVW MEDS BY RX/DR IN RCRD: CPT | Mod: S$GLB,,, | Performed by: UROLOGY

## 2017-03-08 PROCEDURE — 1159F MED LIST DOCD IN RCRD: CPT | Mod: S$GLB,,, | Performed by: UROLOGY

## 2017-03-08 PROCEDURE — 3075F SYST BP GE 130 - 139MM HG: CPT | Mod: S$GLB,,, | Performed by: UROLOGY

## 2017-03-08 PROCEDURE — 99214 OFFICE O/P EST MOD 30 MIN: CPT | Mod: S$GLB,,, | Performed by: UROLOGY

## 2017-03-08 PROCEDURE — 1126F AMNT PAIN NOTED NONE PRSNT: CPT | Mod: S$GLB,,, | Performed by: UROLOGY

## 2017-03-08 PROCEDURE — 99999 PR PBB SHADOW E&M-EST. PATIENT-LVL III: CPT | Mod: PBBFAC,,, | Performed by: UROLOGY

## 2017-03-08 PROCEDURE — 3078F DIAST BP <80 MM HG: CPT | Mod: S$GLB,,, | Performed by: UROLOGY

## 2017-03-08 PROCEDURE — 1157F ADVNC CARE PLAN IN RCRD: CPT | Mod: S$GLB,,, | Performed by: UROLOGY

## 2017-03-08 NOTE — PROGRESS NOTES
Chief Complaint: Renal Cysts    HPI:   3/8/17: 70 yo woman prev seen by Dr. Tillman for renal cyst here for annual followup.  Has a 12mm cyst lower pole left kidney that correlates to finding on CT a couple years ago.    Allergies:  Bactrim [sulfamethoxazole-trimethoprim] and Iodinated contrast media - iv dye    Medications: has a current medication list which includes the following prescription(s): albuterol, amitriptyline, amlodipine-benazepril 5-20 mg, blood sugar diagnostic, blood-glucose meter, cetirizine, cyanocobalamin (vitamin b-12), desonide, dicyclomine, fluticasone, gabapentin, lancets, metformin, methylprednisolone, montelukast, folic acid/multivit-min/lutein, nabumetone, pantoprazole, pyridoxine hcl, sertraline, and tramadol.    Review of Systems:  General: No fever, chills, fatigability, or weight loss.  Skin: No rashes, itching, or changes in color or texture of skin.  Chest: Denies JAMES, cyanosis, wheezing, cough, and sputum production.  Abdomen: Appetite fine. No weight loss. Denies diarrhea, abdominal pain, hematemesis, or blood in stool.  Musculoskeletal: No joint stiffness or swelling. Denies back pain.  : As above.  All other review of systems negative.    PMH:   has a past medical history of Anemia; Anxiety; Arthritis; Asthma; Back pain; Benign colonic polyp; Bulging disc; Cervicalgia (9/17/2013); DDD (degenerative disc disease), lumbar; Depression; Diabetes mellitus; Diverticulosis; Diverticulosis; Hiatal hernia (3/10/2015); Hypertension; Pneumonia; Polyneuropathy; Postmenopausal; Right rotator cuff tear; and Type 2 diabetes mellitus without complication.    PSH:   has a past surgical history that includes hernia surgery; Tubal ligation; Hernia repair; Total abdominal hysterectomy w/ bilateral salpingoophorectomy; Bladder surgery; Colonoscopy; and bone marrow biopsy.    FamHx: family history includes Breast cancer in her mother; Cancer in her sister; Cataracts in her mother; Colon cancer in her  sister and sister; Diabetes in her sister; Heart disease in her mother and sister; Hypertension in her mother; Ovarian cancer in her sister. There is no history of Stroke.    SocHx:  reports that she quit smoking about 31 years ago. Her smoking use included Cigarettes. She has a 3.00 pack-year smoking history. She has never used smokeless tobacco. She reports that she does not drink alcohol or use illicit drugs.     Physical Exam:  Vitals: There were no vitals filed for this visit.  General: A&Ox3. No apparent distress. No deformities.  Neck: No masses. Normal thyroid.  Lungs: normal inspiration. No use of accessory muscles.  Heart: normal pulse. No arrhythmias.  Abdomen: Soft. NT. ND  Skin: The skin is warm and dry. No jaundice.  Ext: No c/c/e.  : deferred    Labs/Studies:   Urinalysis performed in clinic, summary: UA normal    Impression/Plan:   1. Simple small left renal cyst unlikely to ever cause a problem.  In review she would like to continue to observe.  US/RTC 1 year.

## 2017-03-08 NOTE — MR AVS SNAPSHOT
[facility]  [address] [address]  Phone: [phone]  Fax: [phone]                  Adelina Caro   3/8/2017 1:00 PM   Office Visit    Description:  Female : 1945   Provider:  Hardik Rubio IV, MD   Department:  [facility]           Diagnoses this Visit        Comments    Renal cyst    -  Primary            To Do List           Future Appointments        Provider Department Dept Phone    3/21/2017 9:30 AM LABORATORY, [facility] [phone]    3/21/2017 10:00 AM Gordon Mccoy MD [facility] Hemotology Oncology [phone]    3/21/2017 10:30 AM CHAIR [facility] [phone]    2017 8:45 AM Shayla Whaley MD [facility] [phone]    3/8/2018 8:00 AM ONLH US1 [facility] [phone]      Goals (5 Years of Data)     None      Follow-Up and Disposition     Return in about 1 year (around 3/8/2018).    Follow-up and Disposition History      [facility]     [facility] Care Line - 24/ Assistance  Registered nurses in the [facility] provide clinical advisement, health education, appointment booking, and other advisory services.  Call for this free service at [phone].             Medications           Message regarding Medications     Verify the changes and/or additions to your medication regime listed below are the same as discussed with your clinician today.  If any of these changes or additions are incorrect, please notify your healthcare provider.             Verify that the below list of medications is an accurate representation of the medications you are currently taking.  If none reported, the list may be blank. If incorrect, please contact your healthcare provider. Carry this list with you in case of emergency.           Current Medications     albuterol 90 mcg/actuation inhaler Inhale 2 puffs into the lungs every  6 (six) hours as needed for Wheezing.    amitriptyline (ELAVIL) 10 MG tablet TAKE 1 TABLET (10 MG TOTAL) BY MOUTH NIGHTLY AS NEEDED FOR INSOMNIA.    amlodipine-benazepril 5-20 mg (LOTREL) 5-20 mg per capsule Take 1 capsule by mouth every evening.     blood sugar diagnostic (FREESTYLE INSULINX TEST STRIPS) Strp 1 strip by Misc.(Non-Drug; Combo Route) route daily as needed. For Freestyle Lite Glucometer    blood-glucose meter (FREESTYLE SYSTEM KIT) kit Use as instructed - Freestyle Lite Glucometer    CYANOCOBALAMIN, VITAMIN B-12, (VITAMIN B-12 ORAL) Take 1 capsule by mouth once daily.    desonide (DESOWEN) 0.05 % cream Apply topically 2 (two) times daily. No longer than 2 weeks    dicyclomine (BENTYL) 10 MG capsule TAKE 1 CAPSULE (10 MG TOTAL) BY MOUTH 3 (THREE) TIMES DAILY.    fluticasone (FLONASE) 50 mcg/actuation nasal spray 2 sprays by Each Nare route daily as needed for Rhinitis.    gabapentin (NEURONTIN) 300 MG capsule TAKE 1 CAPSULE ON FIRST DAY THEN 1 CAPSULE TWICE A DAY FOR 1 DAY THEN AS DIRECTED    lancets (ONETOUCH DELICA LANCETS) 33 gauge Misc 1 lancet by Misc.(Non-Drug; Combo Route) route daily as needed. For Freestyle Lite Glucometer    metformin (GLUCOPHAGE) 500 MG tablet TAKE 2 TABLETS (1,000 MG TOTAL) BY MOUTH 2 (TWO) TIMES DAILY WITH MEALS.    methylPREDNISolone (MEDROL DOSEPACK) 4 mg tablet use as directed    montelukast (SINGULAIR) 10 mg tablet TAKE 1 TABLET (10 MG TOTAL) BY MOUTH EVERY EVENING.    MULTIVITAMIN W-MINERALS/LUTEIN (CENTRUM SILVER ORAL) Take by mouth once daily.    nabumetone (RELAFEN) 500 MG tablet Take 1 tablet (500 mg total) by mouth 2 (two) times daily.    pantoprazole (PROTONIX) 40 MG tablet TAKE 1 TABLET (40 MG TOTAL) BY MOUTH ONCE DAILY.    PYRIDOXINE HCL (VITAMIN B-6 ORAL) Take 1 tablet by mouth once daily.    sertraline (ZOLOFT) 50 MG tablet TAKE 1 TABLET (50 MG TOTAL) BY MOUTH ONCE DAILY.    tramadol (ULTRAM) 50 mg tablet TAKE 1 TABLET (50 MG TOTAL) BY MOUTH EVERY 6 (SIX)  HOURS AS NEEDED.    cetirizine (ZYRTEC) 5 MG tablet Take 1 tablet (5 mg total) by mouth once daily.           Clinical Reference Information           Your Vitals Were     BP Pulse Weight Last Period BMI    132/62 (BP Location: Left arm, Patient Position: Sitting) 68 79.5 kg (175 lb 4.3 oz) (Exact Date) 31.55 kg/m2      Blood Pressure          Most Recent Value    BP  132/62      Allergies as of 3/8/2017     Bactrim [Sulfamethoxazole-trimethoprim]    Iodinated Contrast Media - Iv Dye      Immunizations Administered on Date of Encounter - 3/8/2017     None      Orders Placed During Today's Visit     Future Labs/Procedures Expected by Expires    US Retroperitoneal Complete (Kidney and  3/8/2017 3/8/2018      Language Assistance Services     ATTENTION: Language assistance services are available, free of charge. Please call [phone].      ATENCIÓN: Si dario noland, tiene a mims disposición servicios gratuitos de asistencia lingüística. Llame al [phone].     CHÚ Ý: N?u b?n nói Ti?ng Vi?t, có các d?ch v? h? tr? ngôn ng? mi?n phí dành cho b?n. G?i s? [phone].         [facility] complies with applicable Federal civil rights laws and does not discriminate on the basis of race, color, national origin, age, disability, or sex.

## 2017-03-21 ENCOUNTER — INFUSION (OUTPATIENT)
Dept: INFUSION THERAPY | Facility: HOSPITAL | Age: 72
End: 2017-03-21
Attending: INTERNAL MEDICINE
Payer: MEDICARE

## 2017-03-21 ENCOUNTER — OFFICE VISIT (OUTPATIENT)
Dept: HEMATOLOGY/ONCOLOGY | Facility: CLINIC | Age: 72
End: 2017-03-21
Payer: MEDICARE

## 2017-03-21 VITALS
HEIGHT: 63 IN | BODY MASS INDEX: 30.16 KG/M2 | WEIGHT: 170.19 LBS | SYSTOLIC BLOOD PRESSURE: 120 MMHG | BODY MASS INDEX: 30.16 KG/M2 | DIASTOLIC BLOOD PRESSURE: 80 MMHG | OXYGEN SATURATION: 96 % | WEIGHT: 170.19 LBS | HEART RATE: 60 BPM | HEIGHT: 63 IN | RESPIRATION RATE: 18 BRPM | TEMPERATURE: 98 F

## 2017-03-21 DIAGNOSIS — D46.4 REFRACTORY ANEMIA: ICD-10-CM

## 2017-03-21 DIAGNOSIS — D46.9 MDS (MYELODYSPLASTIC SYNDROME): Primary | ICD-10-CM

## 2017-03-21 PROCEDURE — 1159F MED LIST DOCD IN RCRD: CPT | Mod: S$GLB,,, | Performed by: INTERNAL MEDICINE

## 2017-03-21 PROCEDURE — 1160F RVW MEDS BY RX/DR IN RCRD: CPT | Mod: S$GLB,,, | Performed by: INTERNAL MEDICINE

## 2017-03-21 PROCEDURE — 1157F ADVNC CARE PLAN IN RCRD: CPT | Mod: S$GLB,,, | Performed by: INTERNAL MEDICINE

## 2017-03-21 PROCEDURE — 96372 THER/PROPH/DIAG INJ SC/IM: CPT | Mod: PO

## 2017-03-21 PROCEDURE — 99499 UNLISTED E&M SERVICE: CPT | Mod: S$GLB,,, | Performed by: INTERNAL MEDICINE

## 2017-03-21 PROCEDURE — 3079F DIAST BP 80-89 MM HG: CPT | Mod: S$GLB,,, | Performed by: INTERNAL MEDICINE

## 2017-03-21 PROCEDURE — 63600175 PHARM REV CODE 636 W HCPCS: Mod: PO | Performed by: INTERNAL MEDICINE

## 2017-03-21 PROCEDURE — 3074F SYST BP LT 130 MM HG: CPT | Mod: S$GLB,,, | Performed by: INTERNAL MEDICINE

## 2017-03-21 PROCEDURE — 99214 OFFICE O/P EST MOD 30 MIN: CPT | Mod: 25,S$GLB,, | Performed by: INTERNAL MEDICINE

## 2017-03-21 PROCEDURE — 1126F AMNT PAIN NOTED NONE PRSNT: CPT | Mod: S$GLB,,, | Performed by: INTERNAL MEDICINE

## 2017-03-21 PROCEDURE — 99999 PR PBB SHADOW E&M-EST. PATIENT-LVL III: CPT | Mod: PBBFAC,,, | Performed by: INTERNAL MEDICINE

## 2017-03-21 RX ADMIN — ERYTHROPOIETIN 20000 UNITS: 20000 INJECTION, SOLUTION INTRAVENOUS; SUBCUTANEOUS at 10:03

## 2017-03-21 NOTE — PROGRESS NOTES
Subjective:       Patient ID: Adelina Caro is a 71 y.o. female.    Chief Complaint: Follow-up (Myelodysplasia) and Results    HPI 71-year-old female with a history of high grade myelodysplasia receiving Procrit 20,000 units every 2 weeks returns for response to erythropoietin therapy    Past Medical History:   Diagnosis Date    Anemia     Anxiety     Arthritis     Asthma     Back pain     Benign colonic polyp     Bulging disc     low back    Cervicalgia 9/17/2013    DDD (degenerative disc disease), lumbar     Depression     Diabetes mellitus     borderline    Diverticulosis     Diverticulosis     Hiatal hernia 3/10/2015    S/P hernia repair x  2    Hypertension     Pneumonia     Polyneuropathy     Postmenopausal     no history of abnormal pap smear    Right rotator cuff tear     right shoulder    Type 2 diabetes mellitus without complication      Family History   Problem Relation Age of Onset    Colon cancer Sister     Colon cancer Sister     Ovarian cancer Sister     Hypertension Mother     Heart disease Mother      MI    Breast cancer Mother     Cataracts Mother     Diabetes Sister     Cancer Sister      unknown    Heart disease Sister      MI/CAD    Stroke Neg Hx      Social History     Social History    Marital status:      Spouse name: N/A    Number of children: 3    Years of education: N/A     Occupational History    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    [employer]     Social History Main Topics    Smoking status: Former Smoker     Packs/day: 1.00     Years: 3.00     Types: Cigarettes     Quit date: 8/22/1985    Smokeless tobacco: Never Used    Alcohol use No    Drug use: No    Sexual activity: No     Other Topics Concern    Not on file     Social History Narrative    She wears seatbelt.     Past Surgical History:   Procedure Laterality Date    BLADDER SURGERY      bone marrow biopsy      COLONOSCOPY      HERNIA REPAIR       hernia surgery      left lower abd wall;  mesh used    TOTAL ABDOMINAL HYSTERECTOMY W/ BILATERAL SALPINGOOPHORECTOMY      TUBAL LIGATION         Labs:  Lab Results   Component Value Date    WBC 6.27 03/21/2017    HGB 9.5 (L) 03/21/2017    HCT 28.9 (L) 03/21/2017    MCV 82 03/21/2017     03/21/2017     BMP  Lab Results   Component Value Date     02/23/2017    K 4.2 02/23/2017     02/23/2017    CO2 28 02/23/2017    BUN 12 02/23/2017    CREATININE 0.8 02/23/2017    CALCIUM 9.3 02/23/2017    ANIONGAP 4 (L) 02/23/2017    ESTGFRAFRICA >60.0 02/23/2017    EGFRNONAA >60.0 02/23/2017     Lab Results   Component Value Date    ALT 10 02/23/2017    AST 13 02/23/2017    ALKPHOS 55 02/23/2017    BILITOT 0.5 02/23/2017       Lab Results   Component Value Date    IRON 58 01/12/2017    TIBC 272 01/12/2017    FERRITIN 1475 (H) 01/12/2017     Lab Results   Component Value Date    ADHZBEUH63 537 01/12/2017     No results found for: FOLATE  Lab Results   Component Value Date    TSH 0.377 (L) 02/23/2017         Review of Systems   Constitutional: Positive for fatigue. Negative for activity change, appetite change, chills, diaphoresis, fever and unexpected weight change.   HENT: Negative for congestion, dental problem, drooling, ear discharge, ear pain, facial swelling, hearing loss, mouth sores, nosebleeds, postnasal drip, rhinorrhea, sinus pressure, sneezing, sore throat, tinnitus, trouble swallowing and voice change.    Eyes: Negative for photophobia, pain, discharge, redness, itching and visual disturbance.   Respiratory: Negative for cough, choking, chest tightness, shortness of breath, wheezing and stridor.    Cardiovascular: Negative for chest pain, palpitations and leg swelling.   Gastrointestinal: Negative for abdominal distention, abdominal pain, anal bleeding, blood in stool, constipation, diarrhea, nausea, rectal pain and vomiting.   Endocrine: Negative for cold intolerance, heat intolerance, polydipsia,  polyphagia and polyuria.   Genitourinary: Negative for decreased urine volume, difficulty urinating, dyspareunia, dysuria, enuresis, flank pain, frequency, genital sores, hematuria, menstrual problem, pelvic pain, urgency, vaginal bleeding, vaginal discharge and vaginal pain.   Musculoskeletal: Negative for arthralgias, back pain, gait problem, joint swelling, myalgias, neck pain and neck stiffness.   Skin: Negative for color change, pallor and rash.   Allergic/Immunologic: Negative for environmental allergies, food allergies and immunocompromised state.   Neurological: Positive for weakness. Negative for dizziness, tremors, seizures, syncope, facial asymmetry, speech difficulty, light-headedness, numbness and headaches.   Hematological: Negative for adenopathy. Does not bruise/bleed easily.   Psychiatric/Behavioral: Negative for agitation, behavioral problems, confusion, decreased concentration, dysphoric mood, hallucinations, self-injury, sleep disturbance and suicidal ideas. The patient is not nervous/anxious and is not hyperactive.        Objective:      Physical Exam   Constitutional: She is oriented to person, place, and time. She appears well-developed and well-nourished. No distress.   HENT:   Head: Normocephalic and atraumatic.   Right Ear: External ear normal.   Left Ear: External ear normal.   Nose: Nose normal. Right sinus exhibits no maxillary sinus tenderness and no frontal sinus tenderness. Left sinus exhibits no maxillary sinus tenderness and no frontal sinus tenderness.   Mouth/Throat: Oropharynx is clear and moist. No oropharyngeal exudate.   Eyes: Conjunctivae, EOM and lids are normal. Pupils are equal, round, and reactive to light. Right eye exhibits no discharge. Left eye exhibits no discharge. Right conjunctiva is not injected. Right conjunctiva has no hemorrhage. Left conjunctiva is not injected. Left conjunctiva has no hemorrhage. No scleral icterus.   Neck: Normal range of motion. Neck  supple. No JVD present. No tracheal deviation present. No thyromegaly present.   Cardiovascular: Normal rate and regular rhythm.    Pulmonary/Chest: Effort normal. No stridor. No respiratory distress. She exhibits no tenderness.   Abdominal: Soft. She exhibits no distension and no mass. There is no splenomegaly or hepatomegaly. There is no tenderness. There is no rebound.   Musculoskeletal: Normal range of motion. She exhibits no edema or tenderness.   Lymphadenopathy:     She has no cervical adenopathy.     She has no axillary adenopathy.        Right: No supraclavicular adenopathy present.        Left: No supraclavicular adenopathy present.   Neurological: She is alert and oriented to person, place, and time. No cranial nerve deficit. Coordination normal.   Skin: Skin is dry. No rash noted. She is not diaphoretic. No erythema.   Psychiatric: She has a normal mood and affect. Her behavior is normal. Judgment and thought content normal.   Vitals reviewed.          Assessment:       1. MDS (myelodysplastic syndrome)    2. Refractory anemia            Plan:         history of myelodysplasia continue with Procrit 20,000 units every 2 weeks patient will return in 6 weeks with CBC and iron status orders written for Procrit ministration markedly improved symptomatology

## 2017-03-21 NOTE — MR AVS SNAPSHOT
Patient Information     Patient Name Sex Adelina Stephenson Female 1945      Visit Information        Provider Department Dept Phone Center    3/21/2017 10:30 AM [facility] Chemo Infusion [facility]fusion [phone] [facility]      Patient Instructions      [facility]  [address] [facility] [address]  [address] [address]  [phone] phone     [phone] fax  Hours of Operation: Monday- Friday 8:00am- 5:00pm  After hours phone  [phone]  Hematology / Oncology Physicians on call      Dr. Darius Pitts    Please call with any concerns regarding your appointment today.       Your Current Medications Are     albuterol 90 mcg/actuation inhaler    amitriptyline (ELAVIL) 10 MG tablet    amlodipine-benazepril 5-20 mg (LOTREL) 5-20 mg per capsule    blood sugar diagnostic (FREESTYLE INSULINX TEST STRIPS) Strp    blood-glucose meter (FREESTYLE SYSTEM KIT) kit    CYANOCOBALAMIN, VITAMIN B-12, (VITAMIN B-12 ORAL)    desonide (DESOWEN) 0.05 % cream    dicyclomine (BENTYL) 10 MG capsule    fluticasone (FLONASE) 50 mcg/actuation nasal spray    gabapentin (NEURONTIN) 300 MG capsule    lancets (ONETOUCH DELICA LANCETS) 33 gauge Misc    metformin (GLUCOPHAGE) 500 MG tablet    methylPREDNISolone (MEDROL DOSEPACK) 4 mg tablet    montelukast (SINGULAIR) 10 mg tablet    MULTIVITAMIN W-MINERALS/LUTEIN (CENTRUM SILVER ORAL)    nabumetone (RELAFEN) 500 MG tablet    pantoprazole (PROTONIX) 40 MG tablet    PYRIDOXINE HCL (VITAMIN B-6 ORAL)    sertraline (ZOLOFT) 50 MG tablet    tramadol (ULTRAM) 50 mg tablet    cetirizine (ZYRTEC) 5 MG tablet      Facility-Administered Medications     epoetin teresa injection 20,000 Units      Appointments for Next Year     2017  9:30 AM INFUSION 015 MIN (15 min.) [facility] CHAIR 04 [facility]    Arrive at check-in approximately 15 minutes before your scheduled appointment time. Bring all outside medical  records and imaging, along with a list of your current medications and insurance card.    4/18/2017  9:30 AM INFUSION 015 MIN (15 min.) [facility] CH[facility]IR [facility]    Arrive at check-in approximately 15 minutes before your scheduled appointment time. Bring all outside medical records and imaging, along with a list of your current medications and insurance card.    5/5/2017  9:20 AM NON FASTING LAB (5 min.) [facility] LABORATORY [facility]    Arrive at check-in approximately 15 minutes before your scheduled appointment time. Bring all outside medical records and imaging, along with a list of your current medications and insurance card.    ([address]) 2nd floor    5/5/2017  9:40 AM ESTABLISHED PATIENT (20 min.) [facility] Hemotology Oncology Gordon Mccoy MD    Arrive at check-in approximately 15 minutes before your scheduled appointment time. Bring all outside medical records and imaging, along with a list of your current medications and insurance card.    ([address]) 3rd Floor    5/5/2017 10:00 AM INFUSION 015 MIN (15 min.) [facility] CHAIR [facility]    Arrive at check-in approximately 15 minutes before your scheduled appointment time. Bring all outside medical records and imaging, along with a list of your current medications and insurance card.    8/9/2017  8:45 AM ESTABLISHED PATIENT (15 min.) [facility] Shayla Whaley MD    Arrive at check-in approximately 15 minutes before your scheduled appointment time. Bring all outside medical records and imaging, along with a list of your current medications and insurance card.    3/8/2018  8:00 AM US RETROPER (45 min.) [facility] ON [address]    Arrive at check-in approximately 15 minutes before your scheduled appointment time. Bring all outside medical records and imaging, along with a list of your current medications and insurance card.    3/12/2018 11:00 AM  "ESTABLISHED PATIENT (20 min.) [facility] Hardik Rubio IV, MD    Arrive at check-in approximately 15 minutes before your scheduled appointment time. Bring all outside medical records and imaging, along with a list of your current medications and insurance card.    (off [facility]) 2nd floor         Default Flowsheet Data (last 24 hours)      Amb Complex Vitals Michi        03/21/17 1023 03/21/17 1010             Measurements    Weight 77.2 kg (170 lb 3.1 oz) 77.2 kg (170 lb 3.1 oz)       Height 5' 2.5" (1.588 m) 5' 2.5" (1.588 m)       BSA (Calculated - sq m) 1.84 sq meters 1.84 sq meters       BMI (Calculated) 30.7 30.7       BP  120/80       Temp  98.1 °F (36.7 °C)       Pulse  60       Resp  18       SpO2  96 %       Pain Assessment    Pain Score Zero Zero               Allergies     Bactrim [Sulfamethoxazole-trimethoprim]     Hospitalized due to acute kidney injury,encephalopathy  And hyponatremia    Iodinated Contrast Media - Iv Dye Anaphylaxis      Current Discharge Medication List     Cannot display discharge medications since this is not an admission.      "

## 2017-03-21 NOTE — PATIENT INSTRUCTIONS
[facility]  [address]  [address] [address]  [phone] phone     [phone] fax  Hours of Operation: Monday- Friday 8:00am- 5:00pm  After hours phone  [phone]  Hematology / Oncology Physicians on call      Dr. Darius Pitts    Please call with any concerns regarding your appointment today.

## 2017-04-05 ENCOUNTER — INFUSION (OUTPATIENT)
Dept: INFUSION THERAPY | Facility: HOSPITAL | Age: 72
End: 2017-04-05
Attending: INTERNAL MEDICINE
Payer: MEDICARE

## 2017-04-05 VITALS — RESPIRATION RATE: 18 BRPM | HEART RATE: 79 BPM | DIASTOLIC BLOOD PRESSURE: 83 MMHG | SYSTOLIC BLOOD PRESSURE: 144 MMHG

## 2017-04-05 DIAGNOSIS — D46.9 MDS (MYELODYSPLASTIC SYNDROME): Primary | ICD-10-CM

## 2017-04-05 DIAGNOSIS — D46.4 REFRACTORY ANEMIA: ICD-10-CM

## 2017-04-05 PROCEDURE — 96372 THER/PROPH/DIAG INJ SC/IM: CPT | Mod: PO

## 2017-04-05 PROCEDURE — 63600175 PHARM REV CODE 636 W HCPCS: Mod: PO | Performed by: INTERNAL MEDICINE

## 2017-04-05 RX ADMIN — ERYTHROPOIETIN 20000 UNITS: 20000 INJECTION, SOLUTION INTRAVENOUS; SUBCUTANEOUS at 10:04

## 2017-04-05 NOTE — PATIENT INSTRUCTIONS
.  [facility]  [address]  [address] [address]  [phone] phone     [phone] fax  Hours of Operation: Monday- Friday 8:00am- 5:00pm  After hours phone  [phone]  Hematology / Oncology Physicians on call      Dr. Darius Pitts    Please call with any concerns regarding your appointment today.

## 2017-04-05 NOTE — NURSING
.Injection given without difficulties.Bandaid applied. Patient instructed to stay in the clinic for 15 minutes. Patient verbalized understanding and will notify nurse with any complaints.

## 2017-04-05 NOTE — MR AVS SNAPSHOT
Patient Information     Patient Name Sex Adelina Stephenson Female 1945      Visit Information        Provider Department Dept Phone Center    2017 9:30 AM [facility] Chemo Infusion [facility] Chemotherapy Infusion [phone] [facility]      Patient Instructions    .  [facility]  [address] [facility] [address]  [address] [address]  [phone] phone     [phone] fax  Hours of Operation: Monday- Friday 8:00am- 5:00pm  After hours phone  [phone]  Hematology / Oncology Physicians on call      Dr. Darius Pitts    Please call with any concerns regarding your appointment today.         Your Current Medications Are     albuterol 90 mcg/actuation inhaler    amitriptyline (ELAVIL) 10 MG tablet    amlodipine-benazepril 5-20 mg (LOTREL) 5-20 mg per capsule    blood sugar diagnostic (FREESTYLE INSULINX TEST STRIPS) Strp    blood-glucose meter (FREESTYLE SYSTEM KIT) kit    cetirizine (ZYRTEC) 5 MG tablet    CYANOCOBALAMIN, VITAMIN B-12, (VITAMIN B-12 ORAL)    desonide (DESOWEN) 0.05 % cream    dicyclomine (BENTYL) 10 MG capsule    fluticasone (FLONASE) 50 mcg/actuation nasal spray    gabapentin (NEURONTIN) 300 MG capsule    lancets (ONETOUCH DELICA LANCETS) 33 gauge Misc    metformin (GLUCOPHAGE) 500 MG tablet    methylPREDNISolone (MEDROL DOSEPACK) 4 mg tablet    montelukast (SINGULAIR) 10 mg tablet    MULTIVITAMIN W-MINERALS/LUTEIN (CENTRUM SILVER ORAL)    nabumetone (RELAFEN) 500 MG tablet    pantoprazole (PROTONIX) 40 MG tablet    PYRIDOXINE HCL (VITAMIN B-6 ORAL)    sertraline (ZOLOFT) 50 MG tablet    tramadol (ULTRAM) 50 mg tablet      Facility-Administered Medications     epoetin teresa injection 20,000 Units      Appointments for Next Year     2017  9:30 AM INFUSION 015 MIN (15 min.) [facility] 06 [facility]    Arrive at check-in approximately 15 minutes before your scheduled appointment time. Bring all outside medical  records and imaging, along with a list of your current medications and insurance card.    5/5/2017  9:20 AM NON FASTING LAB (5 min.) [facility] LABORATORY, [facility]    Arrive at check-in approximately 15 minutes before your scheduled appointment time. Bring all outside medical records and imaging, along with a list of your current medications and insurance card.    (off [address]) 2nd floor    5/5/2017  9:40 AM ESTABLISHED PATIENT (20 min.) [facility] - Hemotology Oncology Gordon Mccoy MD    Arrive at check-in approximately 15 minutes before your scheduled appointment time. Bring all outside medical records and imaging, along with a list of your current medications and insurance card.    ([address]) 3rd Floor    5/5/2017 10:00 AM INFUSION 015 MIN (15 min.) [facility] CHAIR [facility]    Arrive at check-in approximately 15 minutes before your scheduled appointment time. Bring all outside medical records and imaging, along with a list of your current medications and insurance card.    8/9/2017  8:45 AM ESTABLISHED PATIENT (15 min.) [facility] Shayla Whaley MD    Arrive at check-in approximately 15 minutes before your scheduled appointment time. Bring all outside medical records and imaging, along with a list of your current medications and insurance card.    3/8/2018  8:00 AM US RETROPER (45 min.) [facility] ON [address]    Arrive at check-in approximately 15 minutes before your scheduled appointment time. Bring all outside medical records and imaging, along with a list of your current medications and insurance card.    3/12/2018 11:00 AM ESTABLISHED PATIENT (20 min.) [facility] Hardik Rubio IV, MD    Arrive at check-in approximately 15 minutes before your scheduled appointment time. Bring all outside medical records and imaging, along with a list of your current medications and insurance card.    (off [facility]) 2nd floor          Default Flowsheet Data (last 24 hours)      Amb Complex Vitals Michi        04/05/17 1009                Measurements    BP (!)  144/83        Pulse 79        Resp 18        Pain Assessment    Pain Score Zero                Allergies     Bactrim [Sulfamethoxazole-trimethoprim]     Hospitalized due to acute kidney injury,encephalopathy  And hyponatremia    Iodinated Contrast Media - Iv Dye Anaphylaxis      Medications You Received from 04/04/2017 1015 to 04/05/2017 1015        Date/Time Order Dose Route Action     04/05/2017 1013 epoetin teresa injection 20,000 Units 20,000 Units Subcutaneous Given      Current Discharge Medication List     Cannot display discharge medications since this is not an admission.

## 2017-04-19 ENCOUNTER — INFUSION (OUTPATIENT)
Dept: INFUSION THERAPY | Facility: HOSPITAL | Age: 72
End: 2017-04-19
Attending: INTERNAL MEDICINE
Payer: MEDICARE

## 2017-04-19 VITALS
BODY MASS INDEX: 30.16 KG/M2 | SYSTOLIC BLOOD PRESSURE: 140 MMHG | WEIGHT: 170.19 LBS | HEIGHT: 63 IN | DIASTOLIC BLOOD PRESSURE: 76 MMHG | TEMPERATURE: 98 F | HEART RATE: 67 BPM

## 2017-04-19 DIAGNOSIS — D46.9 MDS (MYELODYSPLASTIC SYNDROME): Primary | ICD-10-CM

## 2017-04-19 DIAGNOSIS — D46.4 REFRACTORY ANEMIA: ICD-10-CM

## 2017-04-19 PROCEDURE — 63600175 PHARM REV CODE 636 W HCPCS: Mod: PO | Performed by: INTERNAL MEDICINE

## 2017-04-19 PROCEDURE — 96372 THER/PROPH/DIAG INJ SC/IM: CPT | Mod: PO

## 2017-04-19 RX ADMIN — ERYTHROPOIETIN 20000 UNITS: 20000 INJECTION, SOLUTION INTRAVENOUS; SUBCUTANEOUS at 09:04

## 2017-04-19 NOTE — NURSING
Procrit injection given without difficulties. Bandaid applied. Patient instructed to stay in the clinic for 15 minutes. Patient verbalized understanding and will notify nurse with any complaints.

## 2017-04-19 NOTE — MR AVS SNAPSHOT
Patient Information     Patient Name Sex Adelina Stephenson Female 1945      Visit Information        Provider Department Dep Phone Center    2017 9:30 AM [facility]fusion [phone] [address]      Patient Instructions    Epoetin Rudy Solution for injection   What is this medicine?   EPOETIN RUDY (e ARIAN e tin AL fa) helps your body make more red blood cells. This medicine is used to treat anemia caused by chronic kidney failure, cancer chemotherapy, or HIV-therapy. It may also be used before surgery if you have anemia.   This medicine may be used for other purposes; ask your health care provider or pharmacist if you have questions.   What should I tell my health care provider before I take this medicine?   They need to know if you have any of these conditions:   blood clotting disorders   cancer patient not on chemotherapy   cystic fibrosis   heart disease, such as angina or heart failure   hemoglobin level of 12 g/dL or greater   high blood pressure   low levels of folate, iron, or vitamin B12   seizures   an unusual or allergic reaction to erythropoietin, albumin, benzyl alcohol, hamster proteins, other medicines, foods, dyes, or preservatives   pregnant or trying to get pregnant   breast-feeding  How should I use this medicine?   This medicine is for injection into a vein or under the skin. It is usually given by a health care professional in a hospital or clinic setting.   If you get this medicine at home, you will be taught how to prepare and give this medicine. Use exactly as directed. Take your medicine at regular intervals. Do not take your medicine more often than directed.   It is important that you put your used needles and syringes in a special sharps container. Do not put them in a trash can. If you do not have a sharps container, call your pharmacist or healthcare provider to get one.   Talk to your pediatrician regarding the use of this medicine in  children. While this drug may be prescribed for selected conditions, precautions do apply.   Overdosage: If you think you have taken too much of this medicine contact a poison control center or emergency room at once.   NOTE: This medicine is only for you. Do not share this medicine with others.   What if I miss a dose?   If you miss a dose, take it as soon as you can. If it is almost time for your next dose, take only that dose. Do not take double or extra doses.   What may interact with this medicine?   Do not take this medicine with any of the following medications:   darbepoetin teresa  This list may not describe all possible interactions. Give your health care provider a list of all the medicines, herbs, non-prescription drugs, or dietary supplements you use. Also tell them if you smoke, drink alcohol, or use illegal drugs. Some items may interact with your medicine.   What should I watch for while using this medicine?   Visit your prescriber or health care professional for regular checks on your progress and for the needed blood tests and blood pressure measurements. It is especially important for the doctor to make sure your hemoglobin level is in the desired range, to limit the risk of potential side effects and to give you the best benefit. Keep all appointments for any recommended tests. Check your blood pressure as directed. Ask your doctor what your blood pressure should be and when you should contact him or her.   As your body makes more red blood cells, you may need to take iron, folic acid, or vitamin B supplements. Ask your doctor or health care provider which products are right for you. If you have kidney disease continue dietary restrictions, even though this medication can make you feel better. Talk with your doctor or health care professional about the foods you eat and the vitamins that you take.   What side effects may I notice from receiving this medicine?   Side effects that you should report  to your doctor or health care professional as soon as possible:   allergic reactions like skin rash, itching or hives, swelling of the face, lips, or tongue   breathing problems   changes in vision   chest pain   confusion, trouble speaking or understanding   feeling faint or lightheaded, falls   high blood pressure   muscle aches or pains   pain, swelling, warmth in the leg   rapid weight gain   severe headaches   sudden numbness or weakness of the face, arm or leg   trouble walking, dizziness, loss of balance or coordination   seizures (convulsions)   swelling of the ankles, feet, hands   unusually weak or tired  Side effects that usually do not require medical attention (report to your doctor or health care professional if they continue or are bothersome):   diarrhea   fever, chills (flu-like symptoms)   headaches   nausea, vomiting   redness, stinging, or swelling at site where injected  This list may not describe all possible side effects. Call your doctor for medical advice about side effects. You may report side effects to FDA at 3-497-FDA-2830.   Where should I keep my medicine?   Keep out of the reach of children.   Store in a refrigerator between 2 and 8 degrees C (36 and 46 degrees F). Do not freeze or shake. Throw away any unused portion if using a single-dose vial. Multi-dose vials can be kept in the refrigerator for up to 21 days after the initial dose. Throw away unused medicine.   NOTE:This sheet is a summary. It may not cover all possible information. If you have questions about this medicine, talk to your doctor, pharmacist, or health care provider. Copyright© 2011 [employer]         Your Current Medications Are     albuterol 90 mcg/actuation inhaler    amitriptyline (ELAVIL) 10 MG tablet    amlodipine-benazepril 5-20 mg (LOTREL) 5-20 mg per capsule    blood sugar diagnostic (FREESTYLE INSULINX TEST STRIPS) Strp    blood-glucose meter (FREESTYLE SYSTEM KIT) kit    CYANOCOBALAMIN, VITAMIN B-12,  (VITAMIN B-12 ORAL)    desonide (DESOWEN) 0.05 % cream    dicyclomine (BENTYL) 10 MG capsule    fluticasone (FLONASE) 50 mcg/actuation nasal spray    gabapentin (NEURONTIN) 300 MG capsule    lancets (ONETOUCH DELICA LANCETS) 33 gauge Misc    metformin (GLUCOPHAGE) 500 MG tablet    methylPREDNISolone (MEDROL DOSEPACK) 4 mg tablet    montelukast (SINGULAIR) 10 mg tablet    MULTIVITAMIN W-MINERALS/LUTEIN (CENTRUM SILVER ORAL)    nabumetone (RELAFEN) 500 MG tablet    pantoprazole (PROTONIX) 40 MG tablet    PYRIDOXINE HCL (VITAMIN B-6 ORAL)    sertraline (ZOLOFT) 50 MG tablet    tramadol (ULTRAM) 50 mg tablet    cetirizine (ZYRTEC) 5 MG tablet      Facility-Administered Medications     epoetin teresa injection 20,000 Units      Appointments for Next Year     5/5/2017  9:20 AM NON FASTING LAB (5 min.) [facility] LABORATORY [facility]    Arrive at check-in approximately 15 minutes before your scheduled appointment time. Bring all outside medical records and imaging, along with a list of your current medications and insurance card.    (off [address]) 2nd floor    5/5/2017  9:40 AM ESTABLISHED PATIENT (20 min.) [facility] - Hemotology Oncology Gordon Mccoy MD    Arrive at check-in approximately 15 minutes before your scheduled appointment time. Bring all outside medical records and imaging, along with a list of your current medications and insurance card.    (off [address]) 3rd Floor    5/5/2017 10:00 AM INFUSION 015 MIN (15 min.) [facility] CHAIR 05 [facility]    Arrive at check-in approximately 15 minutes before your scheduled appointment time. Bring all outside medical records and imaging, along with a list of your current medications and insurance card.    8/9/2017  8:45 AM ESTABLISHED PATIENT (15 min.) [facility] Shayla Whaley MD    Arrive at check-in approximately 15 minutes before your scheduled appointment time. Bring all outside medical records and  "imaging, along with a list of your current medications and insurance card.    3/8/2018  8:00 AM US RETROPER (45 min.) [facility] ON [facility]    Arrive at check-in approximately 15 minutes before your scheduled appointment time. Bring all outside medical records and imaging, along with a list of your current medications and insurance card.    3/12/2018 11:00 AM ESTABLISHED PATIENT (20 min.) [facility] Hardik Rubio IV, MD    Arrive at check-in approximately 15 minutes before your scheduled appointment time. Bring all outside medical records and imaging, along with a list of your current medications and insurance card.    (off [facility]) 2nd floor         Default Flowsheet Data (last 24 hours)      Amb Complex Vitals Michi        04/19/17 0939                Measurements    Weight 77.2 kg (170 lb 3.1 oz)        Height 5' 2.5" (1.588 m)        BSA (Calculated - sq m) 1.84 sq meters        BMI (Calculated) 30.7        BP (!)  140/76        Temp 97.6 °F (36.4 °C)        Pulse 67        Pain Assessment    Pain Score Zero                Allergies     Bactrim [Sulfamethoxazole-trimethoprim]     Hospitalized due to acute kidney injury,encephalopathy  And hyponatremia    Iodinated Contrast Media - Oral And Iv Dye Anaphylaxis      Medications You Received from 04/18/2017 1000 to 04/19/2017 1000        Date/Time Order Dose Route Action     04/19/2017 0945 epoetin teresa injection 20,000 Units 20,000 Units Subcutaneous Given      Current Discharge Medication List     Cannot display discharge medications since this is not an admission.      "

## 2017-04-19 NOTE — PATIENT INSTRUCTIONS
Epoetin Rudy Solution for injection   What is this medicine?   EPOETIN RUDY (e ARIAN e tin AL fa) helps your body make more red blood cells. This medicine is used to treat anemia caused by chronic kidney failure, cancer chemotherapy, or HIV-therapy. It may also be used before surgery if you have anemia.   This medicine may be used for other purposes; ask your health care provider or pharmacist if you have questions.   What should I tell my health care provider before I take this medicine?   They need to know if you have any of these conditions:   blood clotting disorders   cancer patient not on chemotherapy   cystic fibrosis   heart disease, such as angina or heart failure   hemoglobin level of 12 g/dL or greater   high blood pressure   low levels of folate, iron, or vitamin B12   seizures   an unusual or allergic reaction to erythropoietin, albumin, benzyl alcohol, hamster proteins, other medicines, foods, dyes, or preservatives   pregnant or trying to get pregnant   breast-feeding  How should I use this medicine?   This medicine is for injection into a vein or under the skin. It is usually given by a health care professional in a hospital or clinic setting.   If you get this medicine at home, you will be taught how to prepare and give this medicine. Use exactly as directed. Take your medicine at regular intervals. Do not take your medicine more often than directed.   It is important that you put your used needles and syringes in a special sharps container. Do not put them in a trash can. If you do not have a sharps container, call your pharmacist or healthcare provider to get one.   Talk to your pediatrician regarding the use of this medicine in children. While this drug may be prescribed for selected conditions, precautions do apply.   Overdosage: If you think you have taken too much of this medicine contact a poison control center or emergency room at once.   NOTE: This medicine is only for you. Do not share this  medicine with others.   What if I miss a dose?   If you miss a dose, take it as soon as you can. If it is almost time for your next dose, take only that dose. Do not take double or extra doses.   What may interact with this medicine?   Do not take this medicine with any of the following medications:   darbepoetin teresa  This list may not describe all possible interactions. Give your health care provider a list of all the medicines, herbs, non-prescription drugs, or dietary supplements you use. Also tell them if you smoke, drink alcohol, or use illegal drugs. Some items may interact with your medicine.   What should I watch for while using this medicine?   Visit your prescriber or health care professional for regular checks on your progress and for the needed blood tests and blood pressure measurements. It is especially important for the doctor to make sure your hemoglobin level is in the desired range, to limit the risk of potential side effects and to give you the best benefit. Keep all appointments for any recommended tests. Check your blood pressure as directed. Ask your doctor what your blood pressure should be and when you should contact him or her.   As your body makes more red blood cells, you may need to take iron, folic acid, or vitamin B supplements. Ask your doctor or health care provider which products are right for you. If you have kidney disease continue dietary restrictions, even though this medication can make you feel better. Talk with your doctor or health care professional about the foods you eat and the vitamins that you take.   What side effects may I notice from receiving this medicine?   Side effects that you should report to your doctor or health care professional as soon as possible:   allergic reactions like skin rash, itching or hives, swelling of the face, lips, or tongue   breathing problems   changes in vision   chest pain   confusion, trouble speaking or understanding   feeling faint or  lightheaded, falls   high blood pressure   muscle aches or pains   pain, swelling, warmth in the leg   rapid weight gain   severe headaches   sudden numbness or weakness of the face, arm or leg   trouble walking, dizziness, loss of balance or coordination   seizures (convulsions)   swelling of the ankles, feet, hands   unusually weak or tired  Side effects that usually do not require medical attention (report to your doctor or health care professional if they continue or are bothersome):   diarrhea   fever, chills (flu-like symptoms)   headaches   nausea, vomiting   redness, stinging, or swelling at site where injected  This list may not describe all possible side effects. Call your doctor for medical advice about side effects. You may report side effects to FDA at 4-425-FDA-6598.   Where should I keep my medicine?   Keep out of the reach of children.   Store in a refrigerator between 2 and 8 degrees C (36 and 46 degrees F). Do not freeze or shake. Throw away any unused portion if using a single-dose vial. Multi-dose vials can be kept in the refrigerator for up to 21 days after the initial dose. Throw away unused medicine.   NOTE:This sheet is a summary. It may not cover all possible information. If you have questions about this medicine, talk to your doctor, pharmacist, or health care provider. Copyright© 2011 [employer]

## 2017-05-02 RX ORDER — SERTRALINE HYDROCHLORIDE 50 MG/1
TABLET, FILM COATED ORAL
Qty: 30 TABLET | Refills: 5 | Status: SHIPPED | OUTPATIENT
Start: 2017-05-02 | End: 2017-08-09 | Stop reason: ALTCHOICE

## 2017-05-03 RX ORDER — TRAMADOL HYDROCHLORIDE 50 MG/1
TABLET ORAL
Qty: 30 TABLET | Refills: 0 | Status: SHIPPED | OUTPATIENT
Start: 2017-05-03 | End: 2017-06-22 | Stop reason: SDUPTHER

## 2017-05-05 ENCOUNTER — INFUSION (OUTPATIENT)
Dept: INFUSION THERAPY | Facility: HOSPITAL | Age: 72
End: 2017-05-05
Attending: INTERNAL MEDICINE
Payer: MEDICARE

## 2017-05-05 ENCOUNTER — OFFICE VISIT (OUTPATIENT)
Dept: HEMATOLOGY/ONCOLOGY | Facility: CLINIC | Age: 72
End: 2017-05-05
Payer: MEDICARE

## 2017-05-05 VITALS
HEIGHT: 62 IN | BODY MASS INDEX: 32.62 KG/M2 | RESPIRATION RATE: 18 BRPM | SYSTOLIC BLOOD PRESSURE: 118 MMHG | TEMPERATURE: 98 F | DIASTOLIC BLOOD PRESSURE: 66 MMHG | OXYGEN SATURATION: 98 % | WEIGHT: 178.38 LBS | BODY MASS INDEX: 32.82 KG/M2 | WEIGHT: 178.38 LBS | HEART RATE: 76 BPM

## 2017-05-05 DIAGNOSIS — D46.9 MDS (MYELODYSPLASTIC SYNDROME): Primary | ICD-10-CM

## 2017-05-05 DIAGNOSIS — D46.4 REFRACTORY ANEMIA: ICD-10-CM

## 2017-05-05 PROCEDURE — 3078F DIAST BP <80 MM HG: CPT | Mod: S$GLB,,, | Performed by: INTERNAL MEDICINE

## 2017-05-05 PROCEDURE — 1159F MED LIST DOCD IN RCRD: CPT | Mod: S$GLB,,, | Performed by: INTERNAL MEDICINE

## 2017-05-05 PROCEDURE — 99214 OFFICE O/P EST MOD 30 MIN: CPT | Mod: 25,S$GLB,, | Performed by: INTERNAL MEDICINE

## 2017-05-05 PROCEDURE — 1160F RVW MEDS BY RX/DR IN RCRD: CPT | Mod: S$GLB,,, | Performed by: INTERNAL MEDICINE

## 2017-05-05 PROCEDURE — 99999 PR PBB SHADOW E&M-EST. PATIENT-LVL III: CPT | Mod: 25,PBBFAC,, | Performed by: INTERNAL MEDICINE

## 2017-05-05 PROCEDURE — 63600175 PHARM REV CODE 636 W HCPCS: Mod: PO | Performed by: INTERNAL MEDICINE

## 2017-05-05 PROCEDURE — 3074F SYST BP LT 130 MM HG: CPT | Mod: S$GLB,,, | Performed by: INTERNAL MEDICINE

## 2017-05-05 PROCEDURE — 99499 UNLISTED E&M SERVICE: CPT | Mod: S$GLB,,, | Performed by: INTERNAL MEDICINE

## 2017-05-05 PROCEDURE — 1126F AMNT PAIN NOTED NONE PRSNT: CPT | Mod: S$GLB,,, | Performed by: INTERNAL MEDICINE

## 2017-05-05 PROCEDURE — 96372 THER/PROPH/DIAG INJ SC/IM: CPT | Mod: PO

## 2017-05-05 RX ADMIN — ERYTHROPOIETIN 20000 UNITS: 20000 INJECTION, SOLUTION INTRAVENOUS; SUBCUTANEOUS at 10:05

## 2017-05-05 NOTE — PROGRESS NOTES
Subjective:       Patient ID: Adelina Caro is a 71 y.o. female.    Chief Complaint: MDS; Results (Labs); and Anemia    HPI 71-year-old female history of myelodysplasia with progressive anemia patient returns after 6 week trial of Procrit 20,000 units every 2 weeks patient is feeling much better stronger and overall sense of well-being    Past Medical History:   Diagnosis Date    Anemia     Anxiety     Arthritis     Asthma     Back pain     Benign colonic polyp     Bulging disc     low back    Cervicalgia 9/17/2013    DDD (degenerative disc disease), lumbar     Depression     Diabetes mellitus     borderline    Diverticulosis     Diverticulosis     Hiatal hernia 3/10/2015    S/P hernia repair x  2    Hypertension     Pneumonia     Polyneuropathy     Postmenopausal     no history of abnormal pap smear    Right rotator cuff tear     right shoulder    Type 2 diabetes mellitus without complication      Family History   Problem Relation Age of Onset    Colon cancer Sister     Colon cancer Sister     Ovarian cancer Sister     Hypertension Mother     Heart disease Mother      MI    Breast cancer Mother     Cataracts Mother     Diabetes Sister     Cancer Sister      unknown    Heart disease Sister      MI/CAD    Stroke Neg Hx      Social History     Social History    Marital status:      Spouse name: N/A    Number of children: 3    Years of education: N/A     Occupational History    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 [employer]    [employer]     Social History Main Topics    Smoking status: Former Smoker     Packs/day: 1.00     Years: 3.00     Types: Cigarettes     Quit date: 8/22/1985    Smokeless tobacco: Never Used    Alcohol use No    Drug use: No    Sexual activity: No     Other Topics Concern    Not on file     Social History Narrative    She wears seatbelt.     Past Surgical History:   Procedure Laterality Date    BLADDER SURGERY      bone marrow  biopsy      COLONOSCOPY      HERNIA REPAIR      hernia surgery      left lower abd wall;  mesh used    TOTAL ABDOMINAL HYSTERECTOMY W/ BILATERAL SALPINGOOPHORECTOMY      TUBAL LIGATION         Labs:  Lab Results   Component Value Date    WBC 7.17 05/05/2017    HGB 9.5 (L) 05/05/2017    HCT 28.9 (L) 05/05/2017    MCV 82 05/05/2017     05/05/2017     BMP  Lab Results   Component Value Date     02/23/2017    K 4.2 02/23/2017     02/23/2017    CO2 28 02/23/2017    BUN 12 02/23/2017    CREATININE 0.8 02/23/2017    CALCIUM 9.3 02/23/2017    ANIONGAP 4 (L) 02/23/2017    ESTGFRAFRICA >60.0 02/23/2017    EGFRNONAA >60.0 02/23/2017     Lab Results   Component Value Date    ALT 10 02/23/2017    AST 13 02/23/2017    ALKPHOS 55 02/23/2017    BILITOT 0.5 02/23/2017       Lab Results   Component Value Date    IRON 73 03/21/2017    TIBC 290 03/21/2017    FERRITIN 1284 (H) 03/21/2017     Lab Results   Component Value Date    [id number] 537 01/12/2017     No results found for: FOLATE  Lab Results   Component Value Date    TSH 0.377 (L) 02/23/2017         Review of Systems   Constitutional: Negative for activity change, appetite change, chills, diaphoresis, fatigue, fever and unexpected weight change.   HENT: Negative for congestion, dental problem, drooling, ear discharge, ear pain, facial swelling, hearing loss, mouth sores, nosebleeds, postnasal drip, rhinorrhea, sinus pressure, sneezing, sore throat, tinnitus, trouble swallowing and voice change.    Eyes: Negative for photophobia, pain, discharge, redness, itching and visual disturbance.   Respiratory: Negative for cough, choking, chest tightness, shortness of breath, wheezing and stridor.    Cardiovascular: Negative for chest pain, palpitations and leg swelling.   Gastrointestinal: Negative for abdominal distention, abdominal pain, anal bleeding, blood in stool, constipation, diarrhea, nausea, rectal pain and vomiting.   Endocrine: Negative for cold  intolerance, heat intolerance, polydipsia, polyphagia and polyuria.   Genitourinary: Negative for decreased urine volume, difficulty urinating, dyspareunia, dysuria, enuresis, flank pain, frequency, genital sores, hematuria, menstrual problem, pelvic pain, urgency, vaginal bleeding, vaginal discharge and vaginal pain.   Musculoskeletal: Negative for arthralgias, back pain, gait problem, joint swelling, myalgias, neck pain and neck stiffness.   Skin: Negative for color change, pallor and rash.   Allergic/Immunologic: Negative for environmental allergies, food allergies and immunocompromised state.   Neurological: Negative for dizziness, tremors, seizures, syncope, facial asymmetry, speech difficulty, weakness, light-headedness, numbness and headaches.   Hematological: Negative for adenopathy. Does not bruise/bleed easily.   Psychiatric/Behavioral: Negative for agitation, behavioral problems, confusion, decreased concentration, dysphoric mood, hallucinations, self-injury, sleep disturbance and suicidal ideas. The patient is not nervous/anxious and is not hyperactive.        Objective:      Physical Exam   Constitutional: She is oriented to person, place, and time. She appears well-developed and well-nourished. No distress.   HENT:   Head: Normocephalic and atraumatic.   Right Ear: External ear normal.   Left Ear: External ear normal.   Nose: Nose normal. Right sinus exhibits no maxillary sinus tenderness and no frontal sinus tenderness. Left sinus exhibits no maxillary sinus tenderness and no frontal sinus tenderness.   Mouth/Throat: Oropharynx is clear and moist. No oropharyngeal exudate.   Eyes: Conjunctivae, EOM and lids are normal. Pupils are equal, round, and reactive to light. Right eye exhibits no discharge. Left eye exhibits no discharge. Right conjunctiva is not injected. Right conjunctiva has no hemorrhage. Left conjunctiva is not injected. Left conjunctiva has no hemorrhage. No scleral icterus.   Neck:  Normal range of motion. Neck supple. No JVD present. No tracheal deviation present. No thyromegaly present.   Cardiovascular: Normal rate, regular rhythm, normal heart sounds and intact distal pulses.    Pulmonary/Chest: Effort normal and breath sounds normal. No stridor. No respiratory distress. She exhibits no tenderness.   Abdominal: Soft. Bowel sounds are normal. She exhibits no distension and no mass. There is no splenomegaly or hepatomegaly. There is no tenderness. There is no rebound.   Musculoskeletal: Normal range of motion. She exhibits no edema or tenderness.   Lymphadenopathy:     She has no cervical adenopathy.     She has no axillary adenopathy.        Right: No supraclavicular adenopathy present.        Left: No supraclavicular adenopathy present.   Neurological: She is alert and oriented to person, place, and time. No cranial nerve deficit. Coordination normal.   Skin: Skin is dry. No rash noted. She is not diaphoretic. No erythema.   Psychiatric: She has a normal mood and affect. Her behavior is normal. Judgment and thought content normal.   Vitals reviewed.          Assessment:       1. MDS (myelodysplastic syndrome)    2. Refractory anemia            Plan:     patient's hemoglobin is increased from 7.4-9.5 continue Procrit 20,000 units every 2 weeks return in 6 weeks CBC iron status orders written

## 2017-05-17 ENCOUNTER — INFUSION (OUTPATIENT)
Dept: INFUSION THERAPY | Facility: HOSPITAL | Age: 72
End: 2017-05-17
Attending: INTERNAL MEDICINE
Payer: MEDICARE

## 2017-05-17 VITALS
SYSTOLIC BLOOD PRESSURE: 138 MMHG | DIASTOLIC BLOOD PRESSURE: 77 MMHG | RESPIRATION RATE: 18 BRPM | HEART RATE: 70 BPM | TEMPERATURE: 98 F

## 2017-05-17 DIAGNOSIS — D46.9 MDS (MYELODYSPLASTIC SYNDROME): Primary | ICD-10-CM

## 2017-05-17 DIAGNOSIS — D46.4 REFRACTORY ANEMIA: ICD-10-CM

## 2017-05-17 PROCEDURE — 96372 THER/PROPH/DIAG INJ SC/IM: CPT | Mod: PO

## 2017-05-17 PROCEDURE — 63600175 PHARM REV CODE 636 W HCPCS: Mod: PO | Performed by: INTERNAL MEDICINE

## 2017-05-17 RX ORDER — DICYCLOMINE HYDROCHLORIDE 10 MG/1
CAPSULE ORAL
Qty: 90 CAPSULE | Refills: 0 | Status: SHIPPED | OUTPATIENT
Start: 2017-05-17

## 2017-05-17 RX ADMIN — ERYTHROPOIETIN 20000 UNITS: 20000 INJECTION, SOLUTION INTRAVENOUS; SUBCUTANEOUS at 09:05

## 2017-05-17 NOTE — NURSING
Procrit injection given without difficulties.Bandaid applied. Patient instructed to stay in the clinic for 15 minutes. Patient verbalized understanding and will notify nurse with any complaints.

## 2017-05-31 ENCOUNTER — INFUSION (OUTPATIENT)
Dept: INFUSION THERAPY | Facility: HOSPITAL | Age: 72
End: 2017-05-31
Attending: INTERNAL MEDICINE
Payer: MEDICARE

## 2017-05-31 VITALS — DIASTOLIC BLOOD PRESSURE: 75 MMHG | HEART RATE: 82 BPM | RESPIRATION RATE: 18 BRPM | SYSTOLIC BLOOD PRESSURE: 146 MMHG

## 2017-05-31 DIAGNOSIS — D46.9 MDS (MYELODYSPLASTIC SYNDROME): Primary | ICD-10-CM

## 2017-05-31 DIAGNOSIS — D46.4 REFRACTORY ANEMIA: ICD-10-CM

## 2017-05-31 PROCEDURE — 96372 THER/PROPH/DIAG INJ SC/IM: CPT | Mod: PO

## 2017-05-31 PROCEDURE — 63600175 PHARM REV CODE 636 W HCPCS: Mod: PO | Performed by: INTERNAL MEDICINE

## 2017-05-31 RX ADMIN — ERYTHROPOIETIN 20000 UNITS: 20000 INJECTION, SOLUTION INTRAVENOUS; SUBCUTANEOUS at 10:05

## 2017-05-31 NOTE — PATIENT INSTRUCTIONS
.  [facility]  [address]  [address] [address]  [phone] phone     [phone] fax  Hours of Operation: Monday- Friday 8:00am- 5:00pm  After hours phone  [phone]  Hematology / Oncology Physicians on call      Dr. Darius Pitts    Please call with any concerns regarding your appointment today.

## 2017-06-14 ENCOUNTER — INFUSION (OUTPATIENT)
Dept: INFUSION THERAPY | Facility: HOSPITAL | Age: 72
End: 2017-06-14
Attending: INTERNAL MEDICINE
Payer: MEDICARE

## 2017-06-14 ENCOUNTER — OFFICE VISIT (OUTPATIENT)
Dept: HEMATOLOGY/ONCOLOGY | Facility: CLINIC | Age: 72
End: 2017-06-14
Payer: MEDICARE

## 2017-06-14 VITALS
HEART RATE: 76 BPM | BODY MASS INDEX: 31.25 KG/M2 | TEMPERATURE: 98 F | OXYGEN SATURATION: 97 % | WEIGHT: 176.38 LBS | SYSTOLIC BLOOD PRESSURE: 156 MMHG | HEIGHT: 63 IN | DIASTOLIC BLOOD PRESSURE: 72 MMHG

## 2017-06-14 DIAGNOSIS — D46.9 MDS (MYELODYSPLASTIC SYNDROME): Primary | ICD-10-CM

## 2017-06-14 DIAGNOSIS — D46.4 REFRACTORY ANEMIA: ICD-10-CM

## 2017-06-14 PROCEDURE — 63600175 PHARM REV CODE 636 W HCPCS: Mod: PO | Performed by: INTERNAL MEDICINE

## 2017-06-14 PROCEDURE — 99999 PR PBB SHADOW E&M-EST. PATIENT-LVL III: CPT | Mod: PBBFAC,,, | Performed by: INTERNAL MEDICINE

## 2017-06-14 PROCEDURE — 1159F MED LIST DOCD IN RCRD: CPT | Mod: S$GLB,,, | Performed by: INTERNAL MEDICINE

## 2017-06-14 PROCEDURE — 96372 THER/PROPH/DIAG INJ SC/IM: CPT | Mod: PO

## 2017-06-14 PROCEDURE — 1126F AMNT PAIN NOTED NONE PRSNT: CPT | Mod: S$GLB,,, | Performed by: INTERNAL MEDICINE

## 2017-06-14 PROCEDURE — 99214 OFFICE O/P EST MOD 30 MIN: CPT | Mod: 25,S$GLB,, | Performed by: INTERNAL MEDICINE

## 2017-06-14 PROCEDURE — 99499 UNLISTED E&M SERVICE: CPT | Mod: S$GLB,,, | Performed by: INTERNAL MEDICINE

## 2017-06-14 RX ADMIN — ERYTHROPOIETIN 20000 UNITS: 20000 INJECTION, SOLUTION INTRAVENOUS; SUBCUTANEOUS at 10:06

## 2017-06-14 NOTE — PROGRESS NOTES
Subjective:       Patient ID: Adelina Caro is a 71 y.o. female.    Chief Complaint: Results (Myelodysplasia) and Anemia    HPI 71-year-old female history of myelodysplasia treatment with Procrit 20,000 units every 2 weeks patients doing recently well feels good maintain hemoglobin 9.5 g range no evidence of progression iron levels checked today    Past Medical History:   Diagnosis Date    Anemia     Anxiety     Arthritis     Asthma     Back pain     Benign colonic polyp     Bulging disc     low back    Cervicalgia 9/17/2013    DDD (degenerative disc disease), lumbar     Depression     Diabetes mellitus     borderline    Diverticulosis     Diverticulosis     Hiatal hernia 3/10/2015    S/P hernia repair x  2    Hypertension     Pneumonia     Polyneuropathy     Postmenopausal     no history of abnormal pap smear    Right rotator cuff tear     right shoulder    Type 2 diabetes mellitus without complication      Family History   Problem Relation Age of Onset    Colon cancer Sister     Colon cancer Sister     Ovarian cancer Sister     Hypertension Mother     Heart disease Mother      MI    Breast cancer Mother     Cataracts Mother     Diabetes Sister     Cancer Sister      unknown    Heart disease Sister      MI/CAD    Stroke Neg Hx      Social History     Social History    Marital status:      Spouse name: N/A    Number of children: 3    Years of education: N/A     Occupational History    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    [employer]     Social History Main Topics    Smoking status: Former Smoker     Packs/day: 1.00     Years: 3.00     Types: Cigarettes     Quit date: 8/22/1985    Smokeless tobacco: Never Used    Alcohol use No    Drug use: No    Sexual activity: No     Other Topics Concern    Not on file     Social History Narrative    She wears seatbelt.     Past Surgical History:   Procedure Laterality Date    BLADDER SURGERY       bone marrow biopsy      COLONOSCOPY      HERNIA REPAIR      hernia surgery      left lower abd wall;  mesh used    TOTAL ABDOMINAL HYSTERECTOMY W/ BILATERAL SALPINGOOPHORECTOMY      TUBAL LIGATION         Labs:  Lab Results   Component Value Date    WBC 6.50 06/14/2017    HGB 9.5 (L) 06/14/2017    HCT 29.2 (L) 06/14/2017    MCV 82 06/14/2017     06/14/2017     BMP  Lab Results   Component Value Date     02/23/2017    K 4.2 02/23/2017     02/23/2017    CO2 28 02/23/2017    BUN 12 02/23/2017    CREATININE 0.8 02/23/2017    CALCIUM 9.3 02/23/2017    ANIONGAP 4 (L) 02/23/2017    ESTGFRAFRICA >60.0 02/23/2017    EGFRNONAA >60.0 02/23/2017     Lab Results   Component Value Date    ALT 10 02/23/2017    AST 13 02/23/2017    ALKPHOS 55 02/23/2017    BILITOT 0.5 02/23/2017       Lab Results   Component Value Date    IRON 54 05/05/2017    TIBC 268 05/05/2017    FERRITIN 874 (H) 05/05/2017     Lab Results   Component Value Date    [id number] 537 01/12/2017     No results found for: FOLATE  Lab Results   Component Value Date    TSH 0.377 (L) 02/23/2017         Review of Systems   Constitutional: Negative for activity change, appetite change, chills, diaphoresis, fatigue, fever and unexpected weight change.   HENT: Negative for congestion, dental problem, drooling, ear discharge, ear pain, facial swelling, hearing loss, mouth sores, nosebleeds, postnasal drip, rhinorrhea, sinus pressure, sneezing, sore throat, tinnitus, trouble swallowing and voice change.    Eyes: Negative for photophobia, pain, discharge, redness, itching and visual disturbance.   Respiratory: Negative for cough, choking, chest tightness, shortness of breath, wheezing and stridor.    Cardiovascular: Negative for chest pain, palpitations and leg swelling.   Gastrointestinal: Negative for abdominal distention, abdominal pain, anal bleeding, blood in stool, constipation, diarrhea, nausea, rectal pain and vomiting.   Endocrine: Negative for  cold intolerance, heat intolerance, polydipsia, polyphagia and polyuria.   Genitourinary: Negative for decreased urine volume, difficulty urinating, dyspareunia, dysuria, enuresis, flank pain, frequency, genital sores, hematuria, menstrual problem, pelvic pain, urgency, vaginal bleeding, vaginal discharge and vaginal pain.   Musculoskeletal: Negative for arthralgias, back pain, gait problem, joint swelling, myalgias, neck pain and neck stiffness.   Skin: Negative for color change, pallor and rash.   Allergic/Immunologic: Negative for environmental allergies, food allergies and immunocompromised state.   Neurological: Negative for dizziness, tremors, seizures, syncope, facial asymmetry, speech difficulty, weakness, light-headedness, numbness and headaches.   Hematological: Negative for adenopathy. Does not bruise/bleed easily.   Psychiatric/Behavioral: Negative for agitation, behavioral problems, confusion, decreased concentration, dysphoric mood, hallucinations, self-injury, sleep disturbance and suicidal ideas. The patient is not nervous/anxious and is not hyperactive.        Objective:      Physical Exam   Constitutional: She is oriented to person, place, and time. She appears well-developed and well-nourished. She appears distressed.   HENT:   Head: Normocephalic and atraumatic.   Right Ear: External ear normal.   Left Ear: External ear normal.   Nose: Nose normal. Right sinus exhibits no maxillary sinus tenderness and no frontal sinus tenderness. Left sinus exhibits no maxillary sinus tenderness and no frontal sinus tenderness.   Mouth/Throat: Oropharynx is clear and moist. No oropharyngeal exudate.   Eyes: Conjunctivae, EOM and lids are normal. Pupils are equal, round, and reactive to light. Right eye exhibits no discharge. Left eye exhibits no discharge. Right conjunctiva is not injected. Right conjunctiva has no hemorrhage. Left conjunctiva is not injected. Left conjunctiva has no hemorrhage. No scleral  icterus.   Neck: Normal range of motion. Neck supple. No JVD present. No tracheal deviation present. No thyromegaly present.   Cardiovascular: Normal rate and regular rhythm.    Pulmonary/Chest: Effort normal. No stridor. No respiratory distress. She exhibits no tenderness.   Abdominal: Soft. She exhibits no distension and no mass. There is no splenomegaly or hepatomegaly. There is no tenderness. There is no rebound.   Musculoskeletal: Normal range of motion. She exhibits no edema or tenderness.   Lymphadenopathy:     She has no cervical adenopathy.     She has no axillary adenopathy.        Right: No supraclavicular adenopathy present.        Left: No supraclavicular adenopathy present.   Neurological: She is alert and oriented to person, place, and time. No cranial nerve deficit. Coordination normal.   Skin: Skin is dry. No rash noted. She is not diaphoretic. No erythema.   Psychiatric: She has a normal mood and affect. Her behavior is normal. Judgment and thought content normal.   Vitals reviewed.          Assessment:      1. MDS (myelodysplastic syndrome)           Plan:   Low-grade myelodysplasia or remain on Procrit 20,000 units every 2 weeks return in 8 weeks with repeat CBC iron status orders written

## 2017-06-14 NOTE — PATIENT INSTRUCTIONS
.  [facility]  [address]  [address] [address]  [phone] phone     [phone] fax  Hours of Operation: Monday- Friday 8:00am- 5:00pm  After hours phone  [phone]  Hematology / Oncology Physicians on call      Dr. Darius Pitts    Please call with any concerns regarding your appointment today.

## 2017-06-22 RX ORDER — TRAMADOL HYDROCHLORIDE 50 MG/1
TABLET ORAL
Qty: 30 TABLET | Refills: 0 | Status: SHIPPED | OUTPATIENT
Start: 2017-06-22 | End: 2017-08-10 | Stop reason: SDUPTHER

## 2017-06-28 ENCOUNTER — INFUSION (OUTPATIENT)
Dept: INFUSION THERAPY | Facility: HOSPITAL | Age: 72
End: 2017-06-28
Attending: INTERNAL MEDICINE
Payer: MEDICARE

## 2017-06-28 VITALS — DIASTOLIC BLOOD PRESSURE: 71 MMHG | SYSTOLIC BLOOD PRESSURE: 118 MMHG | HEART RATE: 74 BPM

## 2017-06-28 DIAGNOSIS — D46.4 REFRACTORY ANEMIA: ICD-10-CM

## 2017-06-28 DIAGNOSIS — D46.9 MDS (MYELODYSPLASTIC SYNDROME): Primary | ICD-10-CM

## 2017-06-28 PROCEDURE — 96372 THER/PROPH/DIAG INJ SC/IM: CPT | Mod: PO

## 2017-06-28 PROCEDURE — 63600175 PHARM REV CODE 636 W HCPCS: Mod: PO | Performed by: INTERNAL MEDICINE

## 2017-06-28 RX ADMIN — ERYTHROPOIETIN 20000 UNITS: 20000 INJECTION, SOLUTION INTRAVENOUS; SUBCUTANEOUS at 09:06

## 2017-07-12 ENCOUNTER — INFUSION (OUTPATIENT)
Dept: INFUSION THERAPY | Facility: HOSPITAL | Age: 72
End: 2017-07-12
Attending: INTERNAL MEDICINE
Payer: MEDICARE

## 2017-07-12 VITALS — SYSTOLIC BLOOD PRESSURE: 144 MMHG | TEMPERATURE: 98 F | DIASTOLIC BLOOD PRESSURE: 74 MMHG | HEART RATE: 82 BPM

## 2017-07-12 DIAGNOSIS — D46.4 REFRACTORY ANEMIA: ICD-10-CM

## 2017-07-12 DIAGNOSIS — D46.9 MDS (MYELODYSPLASTIC SYNDROME): Primary | ICD-10-CM

## 2017-07-12 PROCEDURE — 63600175 PHARM REV CODE 636 W HCPCS: Mod: PO,EC | Performed by: INTERNAL MEDICINE

## 2017-07-12 PROCEDURE — 96372 THER/PROPH/DIAG INJ SC/IM: CPT | Mod: PO

## 2017-07-12 RX ADMIN — ERYTHROPOIETIN 20000 UNITS: 20000 INJECTION, SOLUTION INTRAVENOUS; SUBCUTANEOUS at 09:07

## 2017-07-26 ENCOUNTER — PATIENT OUTREACH (OUTPATIENT)
Dept: ADMINISTRATIVE | Facility: HOSPITAL | Age: 72
End: 2017-07-26

## 2017-07-26 ENCOUNTER — INFUSION (OUTPATIENT)
Dept: INFUSION THERAPY | Facility: HOSPITAL | Age: 72
End: 2017-07-26
Attending: INTERNAL MEDICINE
Payer: MEDICARE

## 2017-07-26 DIAGNOSIS — D46.4 REFRACTORY ANEMIA: ICD-10-CM

## 2017-07-26 DIAGNOSIS — D46.9 MDS (MYELODYSPLASTIC SYNDROME): Primary | ICD-10-CM

## 2017-07-26 PROCEDURE — 63600175 PHARM REV CODE 636 W HCPCS: Mod: PO | Performed by: INTERNAL MEDICINE

## 2017-07-26 PROCEDURE — 96372 THER/PROPH/DIAG INJ SC/IM: CPT | Mod: PO

## 2017-07-26 RX ADMIN — ERYTHROPOIETIN 20000 UNITS: 20000 INJECTION, SOLUTION INTRAVENOUS; SUBCUTANEOUS at 09:07

## 2017-07-31 ENCOUNTER — TELEPHONE (OUTPATIENT)
Dept: FAMILY MEDICINE | Facility: CLINIC | Age: 72
End: 2017-07-31

## 2017-07-31 NOTE — TELEPHONE ENCOUNTER
Pt states her depression meds aren't working anymore.   Has appt on 8/9. Wants to know if she should wait until then or get worked in sooner.

## 2017-07-31 NOTE — TELEPHONE ENCOUNTER
----- Message from Senthil Osborne sent at 7/31/2017  3:30 PM CDT -----  Contact: Pt  Pt needs to speak with nurse regarding depression meds that aren't working. Please give pt a call at ..[phone] (home)

## 2017-07-31 NOTE — TELEPHONE ENCOUNTER
If only taking Zoloft 50 mg daily for depression, increase to 2 pills (100 mg) daily and keep appt w/me. Thanks.

## 2017-08-01 NOTE — TELEPHONE ENCOUNTER
Spoke with patient and she stated that she so not have no bad thoughts and that she will be okay until her appointment. She will continue taking two pills.

## 2017-08-09 ENCOUNTER — INFUSION (OUTPATIENT)
Dept: INFUSION THERAPY | Facility: HOSPITAL | Age: 72
End: 2017-08-09
Attending: INTERNAL MEDICINE
Payer: MEDICARE

## 2017-08-09 ENCOUNTER — OFFICE VISIT (OUTPATIENT)
Dept: FAMILY MEDICINE | Facility: CLINIC | Age: 72
End: 2017-08-09
Payer: MEDICARE

## 2017-08-09 VITALS
BODY MASS INDEX: 31.64 KG/M2 | HEIGHT: 63 IN | HEART RATE: 78 BPM | WEIGHT: 178.56 LBS | RESPIRATION RATE: 16 BRPM | SYSTOLIC BLOOD PRESSURE: 122 MMHG | TEMPERATURE: 96 F | DIASTOLIC BLOOD PRESSURE: 70 MMHG | OXYGEN SATURATION: 98 %

## 2017-08-09 VITALS — HEART RATE: 71 BPM | DIASTOLIC BLOOD PRESSURE: 77 MMHG | SYSTOLIC BLOOD PRESSURE: 164 MMHG

## 2017-08-09 DIAGNOSIS — E11.59 HYPERTENSION ASSOCIATED WITH DIABETES: ICD-10-CM

## 2017-08-09 DIAGNOSIS — M47.812 CERVICAL ARTHRITIS: ICD-10-CM

## 2017-08-09 DIAGNOSIS — D46.9 MDS (MYELODYSPLASTIC SYNDROME): ICD-10-CM

## 2017-08-09 DIAGNOSIS — D46.9 MDS (MYELODYSPLASTIC SYNDROME): Primary | ICD-10-CM

## 2017-08-09 DIAGNOSIS — F33.9 RECURRENT MAJOR DEPRESSIVE DISORDER, REMISSION STATUS UNSPECIFIED: ICD-10-CM

## 2017-08-09 DIAGNOSIS — D46.4 REFRACTORY ANEMIA: ICD-10-CM

## 2017-08-09 DIAGNOSIS — E66.9 OBESITY (BMI 30.0-34.9): ICD-10-CM

## 2017-08-09 DIAGNOSIS — R79.89 ELEVATED TSH: ICD-10-CM

## 2017-08-09 DIAGNOSIS — M51.36 DDD (DEGENERATIVE DISC DISEASE), LUMBAR: ICD-10-CM

## 2017-08-09 DIAGNOSIS — I15.2 HYPERTENSION ASSOCIATED WITH DIABETES: ICD-10-CM

## 2017-08-09 DIAGNOSIS — E11.9 TYPE 2 DIABETES MELLITUS WITHOUT COMPLICATION, WITHOUT LONG-TERM CURRENT USE OF INSULIN: Primary | ICD-10-CM

## 2017-08-09 PROCEDURE — 1159F MED LIST DOCD IN RCRD: CPT | Mod: S$GLB,,, | Performed by: FAMILY MEDICINE

## 2017-08-09 PROCEDURE — 1125F AMNT PAIN NOTED PAIN PRSNT: CPT | Mod: S$GLB,,, | Performed by: FAMILY MEDICINE

## 2017-08-09 PROCEDURE — 3074F SYST BP LT 130 MM HG: CPT | Mod: S$GLB,,, | Performed by: FAMILY MEDICINE

## 2017-08-09 PROCEDURE — 4010F ACE/ARB THERAPY RXD/TAKEN: CPT | Mod: S$GLB,,, | Performed by: FAMILY MEDICINE

## 2017-08-09 PROCEDURE — 99214 OFFICE O/P EST MOD 30 MIN: CPT | Mod: S$GLB,,, | Performed by: FAMILY MEDICINE

## 2017-08-09 PROCEDURE — 63600175 PHARM REV CODE 636 W HCPCS: Mod: PO | Performed by: INTERNAL MEDICINE

## 2017-08-09 PROCEDURE — 99499 UNLISTED E&M SERVICE: CPT | Mod: S$GLB,,, | Performed by: FAMILY MEDICINE

## 2017-08-09 PROCEDURE — 3078F DIAST BP <80 MM HG: CPT | Mod: S$GLB,,, | Performed by: FAMILY MEDICINE

## 2017-08-09 PROCEDURE — 96372 THER/PROPH/DIAG INJ SC/IM: CPT | Mod: PO

## 2017-08-09 PROCEDURE — 3008F BODY MASS INDEX DOCD: CPT | Mod: S$GLB,,, | Performed by: FAMILY MEDICINE

## 2017-08-09 PROCEDURE — 3044F HG A1C LEVEL LT 7.0%: CPT | Mod: S$GLB,,, | Performed by: FAMILY MEDICINE

## 2017-08-09 PROCEDURE — 99999 PR PBB SHADOW E&M-EST. PATIENT-LVL IV: CPT | Mod: PBBFAC,,, | Performed by: FAMILY MEDICINE

## 2017-08-09 RX ORDER — ESCITALOPRAM OXALATE 10 MG/1
10 TABLET ORAL DAILY
Qty: 90 TABLET | Refills: 1 | Status: SHIPPED | OUTPATIENT
Start: 2017-08-09 | End: 2018-02-02 | Stop reason: SDUPTHER

## 2017-08-09 RX ADMIN — ERYTHROPOIETIN 20000 UNITS: 20000 INJECTION, SOLUTION INTRAVENOUS; SUBCUTANEOUS at 10:08

## 2017-08-09 NOTE — PROGRESS NOTES
Subjective:       Patient ID: Adelina Caro is a 71 y.o. female.    Chief Complaint: Diabetes; Hypertension; Follow-up; and Neck Pain    Ms. Caro comes in today for diabetes and hypertension follow-up.  She is fasting and has not taken medication today.  She states she walks little but monitors her diet.  She states she does not perform home glucose checks as she states she does not know how to use her machine.    She reports having intermittent sharp pain at her right neck which radiates into her head for few weeks.  She states she takes tramadol, Norvell, Neurontin with some help for her chronic pains.  She denies having head trauma, syncope, mental status changes.    She reports having depression and feeling sad most days.  She states she desires to be alone a lot.  She states she has been feeling this way for few months.  She denies having anxiety, suicidal or homicidal thoughts.  She takes Zoloft 50 mg daily for depression but states she has been taking 2 pills daily for about 1 month without help with depression.    Otherwise, she denies having fever, chills, fatigue, appetite change; shortness of breath, cough, wheezing; chest pain, palpitations, leg swelling; abdominal pain, nausea, vomiting, diarrhea, constipation; unusual urinary symptoms; polydipsia, polyphagia, polyuria.    She denies tobacco, alcohol or illicit drug use.    She saw Dr. Mccoy, hematologist/oncologist, on June 14, 2017 for surveillance of MDS (myelodysplastic syndrome) with low-grade myelodysplasia and advised to remain on Procrit 20,000 units every 2 weeks and to return in 8 weeks with repeat CBC iron status - scheduled for August 23, 2017.    She saw Dr. Rubio, urologist, on April 8, 2017 for surveillance of renal cysts with 1-year follow up visit and ultrasound scheduled for March 8, 2018 and March 12, 2018 respectively.       Current Outpatient Prescriptions:  albuterol 90 mcg/actuation inhaler, Inhale 2 puffs into the  lungs every 6 (six) hours as needed for Wheezing.  amlodipine-benazepril 5-20 mg (LOTREL) 5-20 mg per capsule, Take 1 capsule by mouth every evening.   blood sugar diagnostic (FREESTYLE INSULINX TEST STRIPS) Strp, 1 strip by Misc.(Non-Drug; Combo Route) route daily as needed. For Freestyle Lite Glucometer  cetirizine (ZYRTEC) 5 MG tablet, Take 1 tablet (5 mg total) by mouth once daily. (Patient taking differently: Take 5 mg by mouth daily as needed. )  CYANOCOBALAMIN, VITAMIN B-12, (VITAMIN B-12 ORAL), Take 1 capsule by mouth once daily.  desonide (DESOWEN) 0.05 % cream, Apply topically 2 (two) times daily. No longer than 2 weeks  dicyclomine (BENTYL) 10 MG capsule, TAKE 1 CAPSULE (10 MG TOTAL) BY MOUTH 3 (THREE) TIMES DAILY.  fluticasone (FLONASE) 50 mcg/actuation nasal spray, 2 sprays by Each Nare route daily as needed for Rhinitis.  gabapentin (NEURONTIN) 300 MG capsule, TAKE 1 CAPSULE ON FIRST DAY THEN 1 CAPSULE TWICE A DAY FOR 1 DAY THEN AS DIRECTED  lancets (ONETOUCH DELICA LANCETS) 33 gauge Misc, 1 lancet by Misc.(Non-Drug; Combo Route) route daily as needed. For Freestyle Lite Glucometer  metformin (GLUCOPHAGE) 500 MG tablet, TAKE 2 TABLETS (1,000 MG TOTAL) BY MOUTH 2 (TWO) TIMES DAILY WITH MEALS.  MULTIVITAMIN W-MINERALS/LUTEIN (CENTRUM SILVER ORAL), Take by mouth once daily.  pantoprazole (PROTONIX) 40 MG tablet, TAKE 1 TABLET (40 MG TOTAL) BY MOUTH ONCE DAILY.  PYRIDOXINE HCL (VITAMIN B-6 ORAL), Take 1 tablet by mouth once daily.  sertraline (ZOLOFT) 50 MG tablet, TAKE 1 TABLET (50 MG TOTAL) BY MOUTH ONCE DAILY. (Patient taking differently: TAKE 1 TABLET (100 MG TOTAL) BY MOUTH ONCE DAILY.)  tramadol (ULTRAM) 50 mg tablet, TAKE 1 TABLET (50 MG TOTAL) BY MOUTH EVERY 6 (SIX) HOURS AS NEEDED.  blood-glucose meter (FREESTYLE SYSTEM KIT) kit, Use as instructed - Freestyle Lite Glucometer      06/24/2015 Cervical spine x-ray - Findings: AP lateral and odontoid views.  Anatomic spinal alignment.  Mild  degenerative vertebral endplate lipping and facet arthropathy at multiple levels.  Gross preservation of the disk spaces.  Odontoid is intact.  No fracture or subluxation.    Labs:                   WBC                      6.50                06/14/2017                 HGB                      9.5 (L)             06/14/2017                 HCT                      29.2 (L)            06/14/2017                 PLT                      195                 06/14/2017                  LDLCALC                  80.4                02/23/2017                         CHOL                     140                 02/23/2017                 TRIG                     123                 02/23/2017                 HDL                      35 (L)              02/23/2017                 ALT                      10                  02/23/2017                 AST                      13                  02/23/2017                 NA                       141                 02/23/2017                 K                        4.2                 02/23/2017                 CL                       109                 02/23/2017                 CREATININE               0.8                 02/23/2017                 BUN                      12                  02/23/2017                 CO2                      28                  02/23/2017                 TSH                      0.377 (L)           02/23/2017                 HGBA1C                   4.7                 02/23/2017                  Review of Systems   Constitutional: Negative for activity change, appetite change, chills, fatigue and fever.        Weight 80.3 kg (177 lb 0.5 oz) at February 9, 2017 visit.   Respiratory: Negative for cough, shortness of breath and wheezing.    Cardiovascular: Negative for chest pain and palpitations.   Gastrointestinal: Negative for abdominal pain, constipation, diarrhea, nausea and vomiting.   Endocrine: Negative for cold intolerance,  heat intolerance, polydipsia, polyphagia and polyuria.        See history of present illness.   Genitourinary: Negative for difficulty urinating.        See history of present illness.     Musculoskeletal: Positive for arthralgias, back pain and neck pain.        Positive for right foot pain.  See history of present illness.   Neurological: Negative for headaches.   Hematological:        See history of present illness.     Psychiatric/Behavioral: Positive for dysphoric mood. Negative for suicidal ideas. The patient is not nervous/anxious.         Negative for homicidal ideas.  See history of present illness.       Objective:      Physical Exam   Constitutional: She is oriented to person, place, and time. She appears well-developed and well-nourished. No distress.   Pleasant.   Neck: Normal range of motion. Neck supple. No thyromegaly present.   Cardiovascular: Normal rate, regular rhythm and intact distal pulses.    No murmur heard.  Pulses:       Dorsalis pedis pulses are 3+ on the right side, and 3+ on the left side.        Posterior tibial pulses are 3+ on the right side, and 3+ on the left side.   Pulmonary/Chest: Effort normal and breath sounds normal. No respiratory distress. She has no wheezes.   Abdominal: Soft. Bowel sounds are normal. She exhibits no distension and no mass. There is no tenderness. There is no rebound and no guarding.   Musculoskeletal: Normal range of motion. She exhibits tenderness. She exhibits no edema.   She is ambulatory without problems. Slightly tender at low back and right neck with full range of motion noted.   Feet:   Right Foot:   Protective Sensation: 5 sites tested. 5 sites sensed.   Skin Integrity: Negative for ulcer or skin breakdown.   Left Foot:   Protective Sensation: 5 sites tested. 5 sites sensed.   Skin Integrity: Negative for ulcer or skin breakdown.   Lymphadenopathy:     She has no cervical adenopathy.   Neurological: She is alert and oriented to person, place, and  time.   Skin: She is not diaphoretic.   Psychiatric: She has a normal mood and affect. Her behavior is normal. Judgment and thought content normal.   Vitals reviewed.      Assessment:       1. Type 2 diabetes mellitus without complication, without long-term current use of insulin    2. Hypertension associated with diabetes    3. DDD (degenerative disc disease), lumbar    4. MDS (myelodysplastic syndrome)    5. Cervical arthritis    6. Recurrent major depressive disorder, remission status unspecified    7. Elevated TSH    8. Obesity (BMI 30.0-34.9)        Plan:       1.  Labs: TSH, BMP, A1c.  Patient advised to correspond via My Chart for results.  2.  Continue current medications, follow low sodium, low cholesterol, low carb diet, daily walks.  3.  Keep follow up with specialists.  4.  Patient declined ophthalmology appointment.   5.  Advised patient she will need to see/consult with Pain management specialist for pain management if other medication options are needed; she declines.  6.  Counseling consultation; patient declined.  7.  Stop Zoloft; try Lexapro 10 mg daily, #90, 1 refill; medication precautions discussed with patient.  8.  Advised patient to avoid daily use of Advil as to avoid kidney problems.  9.  Prescription refills - Tramadol 50 mg every 6 hours prn pain, #30, 0 refill.  10. Flu shot this fall.   11.  See me in 6 weeks for depression follow up but in February 2018 for fasting annual wellness examination.

## 2017-08-10 ENCOUNTER — TELEPHONE (OUTPATIENT)
Dept: FAMILY MEDICINE | Facility: CLINIC | Age: 72
End: 2017-08-10

## 2017-08-10 RX ORDER — TRAMADOL HYDROCHLORIDE 50 MG/1
TABLET ORAL
Qty: 30 TABLET | Refills: 0 | Status: SHIPPED | OUTPATIENT
Start: 2017-08-10 | End: 2017-10-13 | Stop reason: SDUPTHER

## 2017-08-10 NOTE — TELEPHONE ENCOUNTER
----- Message from Jihan Gay sent at 8/10/2017 10:01 AM CDT -----  Contact: pt  She's calling stating that on yesterday, she was supposed to have RX Tramadol and some sleep meds sent to [facility] in [place], but she only rec'd her depression medication, please advise [phone] (home)

## 2017-08-11 RX ORDER — METFORMIN HYDROCHLORIDE 500 MG/1
TABLET ORAL
Qty: 120 TABLET | Refills: 5 | Status: SHIPPED | OUTPATIENT
Start: 2017-08-11

## 2017-08-23 ENCOUNTER — OFFICE VISIT (OUTPATIENT)
Dept: HEMATOLOGY/ONCOLOGY | Facility: CLINIC | Age: 72
End: 2017-08-23
Payer: MEDICARE

## 2017-08-23 ENCOUNTER — INFUSION (OUTPATIENT)
Dept: INFUSION THERAPY | Facility: HOSPITAL | Age: 72
End: 2017-08-23
Attending: INTERNAL MEDICINE
Payer: MEDICARE

## 2017-08-23 VITALS
TEMPERATURE: 98 F | HEART RATE: 70 BPM | HEIGHT: 63 IN | DIASTOLIC BLOOD PRESSURE: 78 MMHG | BODY MASS INDEX: 31.53 KG/M2 | WEIGHT: 177.94 LBS | OXYGEN SATURATION: 97 % | SYSTOLIC BLOOD PRESSURE: 120 MMHG | RESPIRATION RATE: 18 BRPM

## 2017-08-23 DIAGNOSIS — D46.9 MDS (MYELODYSPLASTIC SYNDROME): Primary | ICD-10-CM

## 2017-08-23 DIAGNOSIS — D46.4 REFRACTORY ANEMIA: ICD-10-CM

## 2017-08-23 PROCEDURE — 99214 OFFICE O/P EST MOD 30 MIN: CPT | Mod: 25,S$GLB,, | Performed by: INTERNAL MEDICINE

## 2017-08-23 PROCEDURE — 96372 THER/PROPH/DIAG INJ SC/IM: CPT | Mod: PO

## 2017-08-23 PROCEDURE — 99999 PR PBB SHADOW E&M-EST. PATIENT-LVL III: CPT | Mod: PBBFAC,,, | Performed by: INTERNAL MEDICINE

## 2017-08-23 PROCEDURE — 99499 UNLISTED E&M SERVICE: CPT | Mod: S$GLB,,, | Performed by: INTERNAL MEDICINE

## 2017-08-23 PROCEDURE — 3074F SYST BP LT 130 MM HG: CPT | Mod: S$GLB,,, | Performed by: INTERNAL MEDICINE

## 2017-08-23 PROCEDURE — 3008F BODY MASS INDEX DOCD: CPT | Mod: S$GLB,,, | Performed by: INTERNAL MEDICINE

## 2017-08-23 PROCEDURE — 3078F DIAST BP <80 MM HG: CPT | Mod: S$GLB,,, | Performed by: INTERNAL MEDICINE

## 2017-08-23 PROCEDURE — 1159F MED LIST DOCD IN RCRD: CPT | Mod: S$GLB,,, | Performed by: INTERNAL MEDICINE

## 2017-08-23 PROCEDURE — 63600175 PHARM REV CODE 636 W HCPCS: Mod: PO | Performed by: INTERNAL MEDICINE

## 2017-08-23 PROCEDURE — 1126F AMNT PAIN NOTED NONE PRSNT: CPT | Mod: S$GLB,,, | Performed by: INTERNAL MEDICINE

## 2017-08-23 RX ADMIN — ERYTHROPOIETIN 20000 UNITS: 20000 INJECTION, SOLUTION INTRAVENOUS; SUBCUTANEOUS at 10:08

## 2017-08-23 NOTE — PROGRESS NOTES
Subjective:       Patient ID: Adelina Caro is a 71 y.o. female.    Chief Complaint: Follow-up; Results; and Anemia    HPI 71-year-old female history of anemia secondary to myelodysplasia continues with Procrit by monthly tolerating therapy well denies nausea vomiting fever chills or night sweats    Past Medical History:   Diagnosis Date    Anemia     Anxiety     Arthritis     Asthma     Back pain     Benign colonic polyp     Bulging disc     low back    Cervicalgia 9/17/2013    DDD (degenerative disc disease), lumbar     Depression     Diabetes mellitus     borderline    Diverticulosis     Diverticulosis     Hiatal hernia 3/10/2015    S/P hernia repair x  2    Hypertension     Pneumonia     Polyneuropathy     Postmenopausal     no history of abnormal pap smear    Right rotator cuff tear     right shoulder    Type 2 diabetes mellitus without complication      Family History   Problem Relation Age of Onset    Colon cancer Sister     Colon cancer Sister     Ovarian cancer Sister     Hypertension Mother     Heart disease Mother      MI    Breast cancer Mother     Cataracts Mother     Diabetes Sister     Cancer Sister      unknown    Heart disease Sister      MI/CAD    Stroke Neg Hx      Social History     Social History    Marital status:      Spouse name: N/A    Number of children: 3    Years of education: N/A     Occupational History    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    [employer]     Social History Main Topics    Smoking status: Former Smoker     Packs/day: 1.00     Years: 3.00     Types: Cigarettes     Quit date: 8/22/1985    Smokeless tobacco: Never Used    Alcohol use No    Drug use: No    Sexual activity: No     Other Topics Concern    Not on file     Social History Narrative    She wears seatbelt.     Past Surgical History:   Procedure Laterality Date    BLADDER SURGERY      bone marrow biopsy      COLONOSCOPY      HERNIA  REPAIR      hernia surgery      left lower abd wall;  mesh used    TOTAL ABDOMINAL HYSTERECTOMY W/ BILATERAL SALPINGOOPHORECTOMY      TUBAL LIGATION         Labs:  Lab Results   Component Value Date    WBC 6.38 08/23/2017    HGB 9.7 (L) 08/23/2017    HCT 29.3 (L) 08/23/2017    MCV 83 08/23/2017     08/23/2017     BMP  Lab Results   Component Value Date     08/09/2017    K 4.1 08/09/2017     08/09/2017    CO2 28 08/09/2017    BUN 18 08/09/2017    CREATININE 0.8 08/09/2017    CALCIUM 9.4 08/09/2017    ANIONGAP 7 (L) 08/09/2017    ESTGFRAFRICA >60.0 08/09/2017    EGFRNONAA >60.0 08/09/2017     Lab Results   Component Value Date    ALT 10 02/23/2017    AST 13 02/23/2017    ALKPHOS 55 02/23/2017    BILITOT 0.5 02/23/2017       Lab Results   Component Value Date    IRON 56 06/14/2017    TIBC 266 06/14/2017    FERRITIN 967 (H) 06/14/2017     Lab Results   Component Value Date    LSUOAEKQ68 537 01/12/2017     No results found for: FOLATE  Lab Results   Component Value Date    TSH 1.063 08/09/2017         Review of Systems   Constitutional: Negative for activity change, appetite change, chills, diaphoresis, fatigue, fever and unexpected weight change.   HENT: Negative for congestion, dental problem, drooling, ear discharge, ear pain, facial swelling, hearing loss, mouth sores, nosebleeds, postnasal drip, rhinorrhea, sinus pressure, sneezing, sore throat, tinnitus, trouble swallowing and voice change.    Eyes: Negative for photophobia, pain, discharge, redness, itching and visual disturbance.   Respiratory: Negative for cough, choking, chest tightness, shortness of breath, wheezing and stridor.    Cardiovascular: Negative for chest pain, palpitations and leg swelling.   Gastrointestinal: Negative for abdominal distention, abdominal pain, anal bleeding, blood in stool, constipation, diarrhea, nausea, rectal pain and vomiting.   Endocrine: Negative for cold intolerance, heat intolerance, polydipsia,  polyphagia and polyuria.   Genitourinary: Negative for decreased urine volume, difficulty urinating, dyspareunia, dysuria, enuresis, flank pain, frequency, genital sores, hematuria, menstrual problem, pelvic pain, urgency, vaginal bleeding, vaginal discharge and vaginal pain.   Musculoskeletal: Negative for arthralgias, back pain, gait problem, joint swelling, myalgias, neck pain and neck stiffness.   Skin: Negative for color change, pallor and rash.   Allergic/Immunologic: Negative for environmental allergies, food allergies and immunocompromised state.   Neurological: Negative for dizziness, tremors, seizures, syncope, facial asymmetry, speech difficulty, weakness, light-headedness, numbness and headaches.   Hematological: Negative for adenopathy. Does not bruise/bleed easily.   Psychiatric/Behavioral: Negative for agitation, behavioral problems, confusion, decreased concentration, dysphoric mood, hallucinations, self-injury, sleep disturbance and suicidal ideas. The patient is not nervous/anxious and is not hyperactive.        Objective:      Physical Exam   Constitutional: She is oriented to person, place, and time. She appears well-developed and well-nourished. No distress.   HENT:   Head: Normocephalic and atraumatic.   Right Ear: External ear normal.   Left Ear: External ear normal.   Nose: Nose normal. Right sinus exhibits no maxillary sinus tenderness and no frontal sinus tenderness. Left sinus exhibits no maxillary sinus tenderness and no frontal sinus tenderness.   Mouth/Throat: Oropharynx is clear and moist. No oropharyngeal exudate.   Eyes: Conjunctivae, EOM and lids are normal. Pupils are equal, round, and reactive to light. Right eye exhibits no discharge. Left eye exhibits no discharge. Right conjunctiva is not injected. Right conjunctiva has no hemorrhage. Left conjunctiva is not injected. Left conjunctiva has no hemorrhage. No scleral icterus.   Neck: Normal range of motion. Neck supple. No JVD  present. No tracheal deviation present. No thyromegaly present.   Cardiovascular: Normal rate and regular rhythm.    Pulmonary/Chest: Effort normal. No stridor. No respiratory distress. She exhibits no tenderness.   Abdominal: Soft. She exhibits no distension and no mass. There is no splenomegaly or hepatomegaly. There is no tenderness. There is no rebound.   Musculoskeletal: Normal range of motion. She exhibits no edema or tenderness.   Lymphadenopathy:     She has no cervical adenopathy.     She has no axillary adenopathy.        Right: No supraclavicular adenopathy present.        Left: No supraclavicular adenopathy present.   Neurological: She is alert and oriented to person, place, and time. No cranial nerve deficit. Coordination normal.   Skin: Skin is dry. No rash noted. She is not diaphoretic. No erythema.   Psychiatric: She has a normal mood and affect. Her behavior is normal. Judgment and thought content normal.   Vitals reviewed.          Assessment:      1. MDS (myelodysplastic syndrome)    2. Refractory anemia           Plan:   Patient's hemoglobin is 9.7 continue current dose of Procrit 20,000 units every 2 weeks patient to return in 2 months with CBC BMP and iron status iron status orders today will communicate results to patient to make sure she remains iron deficient appears to be responding well since hemoglobin is risen from 7.4-9.7

## 2017-08-23 NOTE — PATIENT INSTRUCTIONS
[facility]  [address]  [phone] phone     [phone] fax  Hours of Operation: Monday- Friday 8:00am- 5:00pm  After hours phone  [phone]  Hematology / Oncology Physicians on call      Dr. Darius Angulo                        Please call with any concerns regarding your appointment today.

## 2017-09-07 ENCOUNTER — INFUSION (OUTPATIENT)
Dept: INFUSION THERAPY | Facility: HOSPITAL | Age: 72
End: 2017-09-07
Attending: INTERNAL MEDICINE
Payer: MEDICARE

## 2017-09-07 VITALS
OXYGEN SATURATION: 95 % | RESPIRATION RATE: 18 BRPM | HEART RATE: 73 BPM | DIASTOLIC BLOOD PRESSURE: 68 MMHG | SYSTOLIC BLOOD PRESSURE: 134 MMHG | TEMPERATURE: 99 F

## 2017-09-07 DIAGNOSIS — D46.9 MDS (MYELODYSPLASTIC SYNDROME): Primary | ICD-10-CM

## 2017-09-07 DIAGNOSIS — D46.4 REFRACTORY ANEMIA: ICD-10-CM

## 2017-09-07 PROCEDURE — 63600175 PHARM REV CODE 636 W HCPCS: Mod: PO | Performed by: INTERNAL MEDICINE

## 2017-09-07 PROCEDURE — 96372 THER/PROPH/DIAG INJ SC/IM: CPT | Mod: PO

## 2017-09-07 RX ADMIN — ERYTHROPOIETIN 20000 UNITS: 20000 INJECTION, SOLUTION INTRAVENOUS; SUBCUTANEOUS at 09:09

## 2017-09-07 NOTE — PATIENT INSTRUCTIONS
[facility]  [address]  [phone] phone     [phone] fax  Hours of Operation: Monday- Friday 8:00am- 5:00pm  After hours phone  [phone]  Hematology / Oncology Physicians on call      Dr. Darius Angulo                        Please call with any concerns regarding your appointment today.

## 2017-09-20 ENCOUNTER — INFUSION (OUTPATIENT)
Dept: INFUSION THERAPY | Facility: HOSPITAL | Age: 72
End: 2017-09-20
Attending: INTERNAL MEDICINE
Payer: MEDICARE

## 2017-09-20 VITALS
TEMPERATURE: 97 F | SYSTOLIC BLOOD PRESSURE: 124 MMHG | RESPIRATION RATE: 18 BRPM | DIASTOLIC BLOOD PRESSURE: 69 MMHG | HEIGHT: 63 IN | BODY MASS INDEX: 31.53 KG/M2 | OXYGEN SATURATION: 98 % | WEIGHT: 177.94 LBS

## 2017-09-20 DIAGNOSIS — D46.9 MDS (MYELODYSPLASTIC SYNDROME): Primary | ICD-10-CM

## 2017-09-20 DIAGNOSIS — D46.4 REFRACTORY ANEMIA: ICD-10-CM

## 2017-09-20 PROCEDURE — 96372 THER/PROPH/DIAG INJ SC/IM: CPT | Mod: PO

## 2017-09-20 PROCEDURE — 63600175 PHARM REV CODE 636 W HCPCS: Mod: PO | Performed by: INTERNAL MEDICINE

## 2017-09-20 RX ADMIN — ERYTHROPOIETIN 20000 UNITS: 20000 INJECTION, SOLUTION INTRAVENOUS; SUBCUTANEOUS at 09:09

## 2017-09-20 NOTE — PATIENT INSTRUCTIONS
[facility]  [address]  [phone] phone     [phone] fax  Hours of Operation: Monday- Friday 8:00am- 5:00pm  After hours phone  [phone]  Hematology / Oncology Physicians on call      Dr. Darius Angulo                        Please call with any concerns regarding your appointment today.

## 2017-09-21 DIAGNOSIS — D46.9 MDS (MYELODYSPLASTIC SYNDROME): Primary | ICD-10-CM

## 2017-09-21 NOTE — TELEPHONE ENCOUNTER
----- Message from Olimpia Arce sent at 9/21/2017 10:06 AM CDT -----  Contact: Pt   Pt called and stated she needed to speak to the nurse. She stated she is checking the status of a refill request for her blood pressure medication. She can be reached at [phone].    Thanks,  TF

## 2017-09-22 ENCOUNTER — TELEPHONE (OUTPATIENT)
Dept: FAMILY MEDICINE | Facility: CLINIC | Age: 72
End: 2017-09-22

## 2017-09-22 RX ORDER — AMLODIPINE AND BENAZEPRIL HYDROCHLORIDE 5; 20 MG/1; MG/1
1 CAPSULE ORAL NIGHTLY
Qty: 30 CAPSULE | Refills: 5 | Status: SHIPPED | OUTPATIENT
Start: 2017-09-22

## 2017-09-22 NOTE — TELEPHONE ENCOUNTER
----- Message from Miguel Ángel Carr sent at 9/22/2017  9:41 AM CDT -----  Contact: pt  1. What is the name of the medication you are requesting? Amolodopine  2. What is the dose? 5-20 mg  3. How do you take the medication? Orally, topically, etc? Orally  4. How often do you take this medication? Once daily  5. Do you need a 30 day or 90 day supply? 90  6. How many refills are you requesting? 3  7. What is your preferred pharmacy and location of the pharmacy? [facility] in [place] on [address]  8. Who can we contact with further questions? [phone] (home), if no answer leave a message...

## 2017-09-25 DIAGNOSIS — D46.9 MDS (MYELODYSPLASTIC SYNDROME): Primary | ICD-10-CM

## 2017-10-05 ENCOUNTER — INFUSION (OUTPATIENT)
Dept: INFUSION THERAPY | Facility: HOSPITAL | Age: 72
End: 2017-10-05
Attending: INTERNAL MEDICINE
Payer: MEDICARE

## 2017-10-05 VITALS
BODY MASS INDEX: 32.02 KG/M2 | HEART RATE: 69 BPM | TEMPERATURE: 98 F | SYSTOLIC BLOOD PRESSURE: 127 MMHG | WEIGHT: 177.94 LBS | OXYGEN SATURATION: 97 % | DIASTOLIC BLOOD PRESSURE: 71 MMHG

## 2017-10-05 DIAGNOSIS — D46.9 MDS (MYELODYSPLASTIC SYNDROME): Primary | ICD-10-CM

## 2017-10-05 PROCEDURE — 63600175 PHARM REV CODE 636 W HCPCS: Mod: PO | Performed by: INTERNAL MEDICINE

## 2017-10-05 PROCEDURE — 96372 THER/PROPH/DIAG INJ SC/IM: CPT | Mod: PO

## 2017-10-05 RX ADMIN — ERYTHROPOIETIN 20000 UNITS: 20000 INJECTION, SOLUTION INTRAVENOUS; SUBCUTANEOUS at 10:10

## 2017-10-05 NOTE — PATIENT INSTRUCTIONS
Epoetin Rudy Solution for injection   What is this medicine?   EPOETIN RUDY (e ARIAN e tin AL fa) helps your body make more red blood cells. This medicine is used to treat anemia caused by chronic kidney failure, cancer chemotherapy, or HIV-therapy. It may also be used before surgery if you have anemia.   This medicine may be used for other purposes; ask your health care provider or pharmacist if you have questions.   What should I tell my health care provider before I take this medicine?   They need to know if you have any of these conditions:   blood clotting disorders   cancer patient not on chemotherapy   cystic fibrosis   heart disease, such as angina or heart failure   hemoglobin level of 12 g/dL or greater   high blood pressure   low levels of folate, iron, or vitamin B12   seizures   an unusual or allergic reaction to erythropoietin, albumin, benzyl alcohol, hamster proteins, other medicines, foods, dyes, or preservatives   pregnant or trying to get pregnant   breast-feeding  How should I use this medicine?   This medicine is for injection into a vein or under the skin. It is usually given by a health care professional in a hospital or clinic setting.   If you get this medicine at home, you will be taught how to prepare and give this medicine. Use exactly as directed. Take your medicine at regular intervals. Do not take your medicine more often than directed.   It is important that you put your used needles and syringes in a special sharps container. Do not put them in a trash can. If you do not have a sharps container, call your pharmacist or healthcare provider to get one.   Talk to your pediatrician regarding the use of this medicine in children. While this drug may be prescribed for selected conditions, precautions do apply.   Overdosage: If you think you have taken too much of this medicine contact a poison control center or emergency room at once.   NOTE: This medicine is only for you. Do not share this  medicine with others.   What if I miss a dose?   If you miss a dose, take it as soon as you can. If it is almost time for your next dose, take only that dose. Do not take double or extra doses.   What may interact with this medicine?   Do not take this medicine with any of the following medications:   darbepoetin teresa  This list may not describe all possible interactions. Give your health care provider a list of all the medicines, herbs, non-prescription drugs, or dietary supplements you use. Also tell them if you smoke, drink alcohol, or use illegal drugs. Some items may interact with your medicine.   What should I watch for while using this medicine?   Visit your prescriber or health care professional for regular checks on your progress and for the needed blood tests and blood pressure measurements. It is especially important for the doctor to make sure your hemoglobin level is in the desired range, to limit the risk of potential side effects and to give you the best benefit. Keep all appointments for any recommended tests. Check your blood pressure as directed. Ask your doctor what your blood pressure should be and when you should contact him or her.   As your body makes more red blood cells, you may need to take iron, folic acid, or vitamin B supplements. Ask your doctor or health care provider which products are right for you. If you have kidney disease continue dietary restrictions, even though this medication can make you feel better. Talk with your doctor or health care professional about the foods you eat and the vitamins that you take.   What side effects may I notice from receiving this medicine?   Side effects that you should report to your doctor or health care professional as soon as possible:   allergic reactions like skin rash, itching or hives, swelling of the face, lips, or tongue   breathing problems   changes in vision   chest pain   confusion, trouble speaking or understanding   feeling faint or  lightheaded, falls   high blood pressure   muscle aches or pains   pain, swelling, warmth in the leg   rapid weight gain   severe headaches   sudden numbness or weakness of the face, arm or leg   trouble walking, dizziness, loss of balance or coordination   seizures (convulsions)   swelling of the ankles, feet, hands   unusually weak or tired  Side effects that usually do not require medical attention (report to your doctor or health care professional if they continue or are bothersome):   diarrhea   fever, chills (flu-like symptoms)   headaches   nausea, vomiting   redness, stinging, or swelling at site where injected  This list may not describe all possible side effects. Call your doctor for medical advice about side effects. You may report side effects to FDA at 3-285-FDA-3855.   Where should I keep my medicine?   Keep out of the reach of children.   Store in a refrigerator between 2 and 8 degrees C (36 and 46 degrees F). Do not freeze or shake. Throw away any unused portion if using a single-dose vial. Multi-dose vials can be kept in the refrigerator for up to 21 days after the initial dose. Throw away unused medicine.   NOTE:This sheet is a summary. It may not cover all possible information. If you have questions about this medicine, talk to your doctor, pharmacist, or health care provider. Copyright© 2011 [employer]

## 2017-10-12 ENCOUNTER — OFFICE VISIT (OUTPATIENT)
Dept: FAMILY MEDICINE | Facility: CLINIC | Age: 72
End: 2017-10-12
Payer: MEDICARE

## 2017-10-12 VITALS
WEIGHT: 179 LBS | HEART RATE: 77 BPM | DIASTOLIC BLOOD PRESSURE: 80 MMHG | HEIGHT: 63 IN | TEMPERATURE: 97 F | OXYGEN SATURATION: 97 % | BODY MASS INDEX: 31.71 KG/M2 | SYSTOLIC BLOOD PRESSURE: 132 MMHG | RESPIRATION RATE: 16 BRPM

## 2017-10-12 DIAGNOSIS — F33.9 EPISODE OF RECURRENT MAJOR DEPRESSIVE DISORDER, UNSPECIFIED DEPRESSION EPISODE SEVERITY: ICD-10-CM

## 2017-10-12 DIAGNOSIS — R41.3 MEMORY DEFICIT: ICD-10-CM

## 2017-10-12 PROCEDURE — 99499 UNLISTED E&M SERVICE: CPT | Mod: S$GLB,,, | Performed by: FAMILY MEDICINE

## 2017-10-12 PROCEDURE — 99214 OFFICE O/P EST MOD 30 MIN: CPT | Mod: 25,S$GLB,, | Performed by: FAMILY MEDICINE

## 2017-10-12 PROCEDURE — G0008 ADMIN INFLUENZA VIRUS VAC: HCPCS | Mod: S$GLB,,, | Performed by: FAMILY MEDICINE

## 2017-10-12 PROCEDURE — 99999 PR PBB SHADOW E&M-EST. PATIENT-LVL V: CPT | Mod: PBBFAC,,, | Performed by: FAMILY MEDICINE

## 2017-10-12 PROCEDURE — 90662 IIV NO PRSV INCREASED AG IM: CPT | Mod: S$GLB,,, | Performed by: FAMILY MEDICINE

## 2017-10-12 RX ORDER — DICLOFENAC SODIUM 10 MG/G
GEL TOPICAL
COMMUNITY
Start: 2017-09-13

## 2017-10-12 NOTE — PROGRESS NOTES
Subjective:       Patient ID: Adelina Caro is a 71 y.o. female.    Chief Complaint: Depression    Ms. Caro comes in today for 1-month depression follow-up.  She states she feels slightly depressed today but denies anxiety, homicidal or suicidal thoughts. On August 19, 2017 Zoloft was stopped as she stated it was no longer working.  Lexapro 10 mg daily was started she states she has not been having problems with and states it seems to help better with depression and Zoloft.  She states she forgets a lot which she states has been occurring intermittently for some time.  She reports difficulty with completing tasks.  She states she sometimes feels breathless.  She also reports feeling tired and irritable.  She reports occasionally having neck pain radiating to her head due to arthritis.  She also reports having chronic back pain.  She states she worries a lot despite no reports of anxiety, suicidal or homicidal thoughts.  She denies head trauma.    She reports no family history of dementia.    She reports this morning having right anterior chest heaviness, now resolved.    She states she drove herself to the appointment today.  She states she continues to work week he ends at [employer].    Otherwise, she denies having fever, chills, appetite change; cough, wheezing; abdominal pain, nausea, vomiting, diarrhea, constipation; unusual urinary symptoms.      Current Outpatient Prescriptions:  albuterol 90 mcg/actuation inhaler, Inhale 2 puffs into the lungs every 6 (six) hours as needed for Wheezing.  amlodipine-benazepril 5-20 mg (LOTREL) 5-20 mg per capsule, Take 1 capsule by mouth every evening.  blood sugar diagnostic (FREESTYLE INSULINX TEST STRIPS) Strp, 1 strip by Misc.(Non-Drug; Combo Route) route daily as needed. For Freestyle Lite Glucometer  cetirizine (ZYRTEC) 5 MG tablet, Take 1 tablet (5 mg total) by mouth once daily. (Patient taking differently: Take 5 mg by mouth daily as needed. )  CYANOCOBALAMIN,  VITAMIN B-12, (VITAMIN B-12 ORAL), Take 1 capsule by mouth once daily.  desonide (DESOWEN) 0.05 % cream, Apply topically 2 (two) times daily. No longer than 2 weeks  diclofenac sodium 1 % Gel,   dicyclomine (BENTYL) 10 MG capsule, TAKE 1 CAPSULE (10 MG TOTAL) BY MOUTH 3 (THREE) TIMES DAILY.  escitalopram oxalate (LEXAPRO) 10 MG tablet, Take 1 tablet (10 mg total) by mouth once daily.  fluticasone (FLONASE) 50 mcg/actuation nasal spray, 2 sprays by Each Nare route daily as needed for Rhinitis.  gabapentin (NEURONTIN) 300 MG capsule, TAKE 1 CAPSULE ON FIRST DAY THEN 1 CAPSULE TWICE A DAY FOR 1 DAY THEN AS DIRECTED  lancets (ONETOUCH DELICA LANCETS) 33 gauge Misc, 1 lancet by Misc.(Non-Drug; Combo Route) route daily as needed. For Freestyle Lite Glucometer  metformin (GLUCOPHAGE) 500 MG tablet, TAKE 2 TABLETS (1,000 MG TOTAL) BY MOUTH 2 (TWO) TIMES DAILY WITH MEALS.  MULTIVITAMIN W-MINERALS/LUTEIN (CENTRUM SILVER ORAL), Take by mouth once daily.  pantoprazole (PROTONIX) 40 MG tablet, TAKE 1 TABLET (40 MG TOTAL) BY MOUTH ONCE DAILY.  PYRIDOXINE HCL (VITAMIN B-6 ORAL), Take 1 tablet by mouth once daily.  tramadol (ULTRAM) 50 mg tablet, TAKE 1 TABLET (50 MG TOTAL) BY MOUTH EVERY 6 (SIX) HOURS AS NEEDED.  blood-glucose meter (FREESTYLE SYSTEM KIT) kit, Use as instructed - Freestyle Lite Glucometer            Review of Systems   Constitutional: Negative for appetite change, chills, fatigue and fever.   Respiratory: Positive for shortness of breath. Negative for cough and wheezing.    Gastrointestinal: Negative for abdominal pain, constipation, diarrhea, nausea and vomiting.   Genitourinary: Negative for difficulty urinating.   Musculoskeletal: Positive for back pain and neck pain.   Neurological: Positive for headaches.        Positive for memory deficits.   Psychiatric/Behavioral: Positive for agitation, dysphoric mood and sleep disturbance. Negative for suicidal ideas. The patient is nervous/anxious.         Negative for  homicidal ideas.  See history of present illness.       Objective:      Physical Exam   Constitutional: She is oriented to person, place, and time. She appears well-developed and well-nourished. No distress.   Pleasant.   Neck: Normal range of motion. Neck supple. No thyromegaly present.   Cardiovascular: Normal rate, regular rhythm and intact distal pulses.    No murmur heard.  Pulmonary/Chest: Effort normal and breath sounds normal. No respiratory distress. She has no wheezes. She exhibits tenderness.   Slightly tender at right anterior chest wall with full range of motion noted.   Abdominal: Soft. Bowel sounds are normal. She exhibits no distension and no mass. There is no tenderness. There is no rebound and no guarding.   Musculoskeletal: Normal range of motion. She exhibits no edema or tenderness.   She is ambulatory without problems.    Lymphadenopathy:     She has no cervical adenopathy.   Neurological: She is alert and oriented to person, place, and time.   Skin: She is not diaphoretic.   Psychiatric: Her behavior is normal. Judgment and thought content normal.   Slight depressed demeanor noted.   Vitals reviewed.      Assessment:       1. Memory deficit    2. Episode of recurrent major depressive disorder, unspecified depression episode severity        Plan:       1.  Neurology and/or psychiatry consultation.  2.  Continue current medications, follow low sodium, low cholesterol, low carb diet, daily walks.  3.  High dose flu shot today.  4.  Keep 2/26/2018 scheduled appointment with me but follow up sooner if worsening problems.

## 2017-10-13 RX ORDER — TRAMADOL HYDROCHLORIDE 50 MG/1
TABLET ORAL
Qty: 30 TABLET | Refills: 0 | Status: SHIPPED | OUTPATIENT
Start: 2017-10-13 | End: 2017-11-24 | Stop reason: SDUPTHER

## 2017-10-19 ENCOUNTER — SOCIAL WORK (OUTPATIENT)
Dept: HEMATOLOGY/ONCOLOGY | Facility: CLINIC | Age: 72
End: 2017-10-19

## 2017-10-19 ENCOUNTER — INFUSION (OUTPATIENT)
Dept: INFUSION THERAPY | Facility: HOSPITAL | Age: 72
End: 2017-10-19
Attending: INTERNAL MEDICINE
Payer: MEDICARE

## 2017-10-19 ENCOUNTER — OFFICE VISIT (OUTPATIENT)
Dept: HEMATOLOGY/ONCOLOGY | Facility: CLINIC | Age: 72
End: 2017-10-19
Payer: MEDICARE

## 2017-10-19 VITALS
WEIGHT: 181.44 LBS | BODY MASS INDEX: 32.15 KG/M2 | BODY MASS INDEX: 32.66 KG/M2 | HEIGHT: 63 IN | DIASTOLIC BLOOD PRESSURE: 74 MMHG | TEMPERATURE: 98 F | OXYGEN SATURATION: 99 % | WEIGHT: 181.44 LBS | SYSTOLIC BLOOD PRESSURE: 152 MMHG | HEART RATE: 67 BPM

## 2017-10-19 DIAGNOSIS — D46.9 MDS (MYELODYSPLASTIC SYNDROME): Primary | ICD-10-CM

## 2017-10-19 DIAGNOSIS — D46.4 REFRACTORY ANEMIA: ICD-10-CM

## 2017-10-19 PROCEDURE — 99999 PR PBB SHADOW E&M-EST. PATIENT-LVL III: CPT | Mod: PBBFAC,,, | Performed by: INTERNAL MEDICINE

## 2017-10-19 PROCEDURE — 99214 OFFICE O/P EST MOD 30 MIN: CPT | Mod: S$GLB,,, | Performed by: INTERNAL MEDICINE

## 2017-10-19 PROCEDURE — 96372 THER/PROPH/DIAG INJ SC/IM: CPT | Mod: PO

## 2017-10-19 PROCEDURE — 63600175 PHARM REV CODE 636 W HCPCS: Mod: PO | Performed by: INTERNAL MEDICINE

## 2017-10-19 RX ADMIN — ERYTHROPOIETIN 40000 UNITS: 40000 INJECTION, SOLUTION INTRAVENOUS; SUBCUTANEOUS at 11:10

## 2017-10-19 NOTE — PROGRESS NOTES
Subjective:       Patient ID: Adelina Caro is a 71 y.o. female.    Chief Complaint: Results and Anemia    HPI 71-year-old female history of anemia second to myelodysplasia increasing fatigue and weakness patient returns for evaluation of Procrit ministration    Past Medical History:   Diagnosis Date    Anemia     Anxiety     Arthritis     Asthma     Back pain     Benign colonic polyp     Bulging disc     low back    Cervicalgia 9/17/2013    DDD (degenerative disc disease), lumbar     Depression     Diabetes mellitus     borderline    Diverticulosis     Diverticulosis     Hiatal hernia 3/10/2015    S/P hernia repair x  2    Hypertension     Pneumonia     Polyneuropathy     Postmenopausal     no history of abnormal pap smear    Right rotator cuff tear     right shoulder    Type 2 diabetes mellitus without complication      Family History   Problem Relation Age of Onset    Colon cancer Sister     Colon cancer Sister     Ovarian cancer Sister     Hypertension Mother     Heart disease Mother      MI    Breast cancer Mother     Cataracts Mother     Diabetes Sister     Cancer Sister      unknown    Heart disease Sister      MI/CAD    Stroke Neg Hx     Dementia Neg Hx      Social History     Social History    Marital status:      Spouse name: N/A    Number of children: 3    Years of education: N/A     Occupational History    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    [employer]     Social History Main Topics    Smoking status: Former Smoker     Packs/day: 1.00     Years: 3.00     Types: Cigarettes     Quit date: 8/22/1985    Smokeless tobacco: Never Used    Alcohol use No    Drug use: No    Sexual activity: No     Other Topics Concern    Not on file     Social History Narrative    She wears seatbelt.     Past Surgical History:   Procedure Laterality Date    BLADDER SURGERY      bone marrow biopsy      COLONOSCOPY      HERNIA REPAIR       hernia surgery      left lower abd wall;  mesh used    TOTAL ABDOMINAL HYSTERECTOMY W/ BILATERAL SALPINGOOPHORECTOMY      TUBAL LIGATION         Labs:  Lab Results   Component Value Date    WBC 6.21 10/19/2017    HGB 9.2 (L) 10/19/2017    HCT 27.4 (L) 10/19/2017    MCV 82 10/19/2017     10/19/2017     BMP  Lab Results   Component Value Date     10/19/2017    K 4.5 10/19/2017     10/19/2017    CO2 30 (H) 10/19/2017    BUN 17 10/19/2017    CREATININE 0.8 10/19/2017    CALCIUM 9.6 10/19/2017    ANIONGAP 6 (L) 10/19/2017    ESTGFRAFRICA >60 10/19/2017    EGFRNONAA >60 10/19/2017     Lab Results   Component Value Date    ALT 10 02/23/2017    AST 13 02/23/2017    ALKPHOS 55 02/23/2017    BILITOT 0.5 02/23/2017       Lab Results   Component Value Date    IRON 62 08/23/2017    TIBC 292 08/23/2017    FERRITIN 688 (H) 08/23/2017     Lab Results   Component Value Date    UEWPCTMF83 537 01/12/2017     No results found for: FOLATE  Lab Results   Component Value Date    TSH 1.063 08/09/2017         Review of Systems   Constitutional: Positive for activity change and fatigue. Negative for appetite change, chills, diaphoresis, fever and unexpected weight change.   HENT: Negative for congestion, dental problem, drooling, ear discharge, ear pain, facial swelling, hearing loss, mouth sores, nosebleeds, postnasal drip, rhinorrhea, sinus pressure, sneezing, sore throat, tinnitus, trouble swallowing and voice change.    Eyes: Negative for photophobia, pain, discharge, redness, itching and visual disturbance.   Respiratory: Negative for cough, choking, chest tightness, shortness of breath, wheezing and stridor.    Cardiovascular: Negative for chest pain, palpitations and leg swelling.   Gastrointestinal: Negative for abdominal distention, abdominal pain, anal bleeding, blood in stool, constipation, diarrhea, nausea, rectal pain and vomiting.   Endocrine: Negative for cold intolerance, heat intolerance, polydipsia,  polyphagia and polyuria.   Genitourinary: Negative for decreased urine volume, difficulty urinating, dyspareunia, dysuria, enuresis, flank pain, frequency, genital sores, hematuria, menstrual problem, pelvic pain, urgency, vaginal bleeding, vaginal discharge and vaginal pain.   Musculoskeletal: Negative for arthralgias, back pain, gait problem, joint swelling, myalgias, neck pain and neck stiffness.   Skin: Negative for color change, pallor and rash.   Allergic/Immunologic: Negative for environmental allergies, food allergies and immunocompromised state.   Neurological: Positive for weakness. Negative for dizziness, tremors, seizures, syncope, facial asymmetry, speech difficulty, light-headedness, numbness and headaches.   Hematological: Negative for adenopathy. Does not bruise/bleed easily.   Psychiatric/Behavioral: Positive for dysphoric mood. Negative for agitation, behavioral problems, confusion, decreased concentration, hallucinations, self-injury, sleep disturbance and suicidal ideas. The patient is nervous/anxious. The patient is not hyperactive.        Objective:      Physical Exam   Constitutional: She is oriented to person, place, and time. She appears well-developed and well-nourished. She has a sickly appearance. She appears ill. She appears distressed.   HENT:   Head: Normocephalic and atraumatic.   Right Ear: External ear normal.   Left Ear: External ear normal.   Nose: Nose normal. Right sinus exhibits no maxillary sinus tenderness and no frontal sinus tenderness. Left sinus exhibits no maxillary sinus tenderness and no frontal sinus tenderness.   Mouth/Throat: Oropharynx is clear and moist. No oropharyngeal exudate.   Eyes: Conjunctivae, EOM and lids are normal. Pupils are equal, round, and reactive to light. Right eye exhibits no discharge. Left eye exhibits no discharge. Right conjunctiva is not injected. Right conjunctiva has no hemorrhage. Left conjunctiva is not injected. Left conjunctiva has no  hemorrhage. No scleral icterus.   Neck: Normal range of motion. Neck supple. No JVD present. No tracheal deviation present. No thyromegaly present.   Cardiovascular: Normal rate and regular rhythm.    Pulmonary/Chest: Effort normal. No stridor. No respiratory distress. She exhibits no tenderness.   Abdominal: Soft. She exhibits no distension and no mass. There is no splenomegaly or hepatomegaly. There is no tenderness. There is no rebound.   Musculoskeletal: Normal range of motion. She exhibits no edema or tenderness.   Lymphadenopathy:     She has no cervical adenopathy.     She has no axillary adenopathy.        Right: No supraclavicular adenopathy present.        Left: No supraclavicular adenopathy present.   Neurological: She is alert and oriented to person, place, and time. No cranial nerve deficit. Coordination normal.   Skin: Skin is dry. No rash noted. She is not diaphoretic. No erythema.   Psychiatric: Her behavior is normal. Judgment and thought content normal. Her mood appears anxious. She exhibits a depressed mood.   Vitals reviewed.          Assessment:      1. MDS (myelodysplastic syndrome)    2. Refractory anemia           Plan:   Patient is currently improving 20,000 units every 2 weeks hemoglobin today is 9.2 will escalate Procrit dose of 40,000 units every 2 weeks return in 6 weeks with CBC iron status.  Place referral to  for possible consultation with oncologic so psychologist for evaluation of recurrent episodes of depression

## 2017-10-19 NOTE — PROGRESS NOTES
"SW met pt today at the request of the provider due to some depressive symptoms. Pt was friendly as indicated by her warm smile, but she also seemed hesitant to open up about her struggles initially. Pt expressed loneliness and her overall disappointment in her grown children. Pt talked about her two sons and how neither are present much in her life or in the lives of their children. Pt reported she does not know "what we did wrong" in rearing them for them to not be responsible parents and adults. SW validated her feelings of disappointment while also explaining we can only do so much as parents, as own children become their own people and have the right to make their own choices. Pt's main concern right now is for her grandchildren for her daughter with whom she lives. Pt explained her daughter is a workaholic and she sees this as destructive to the children. Pt feels the children are too attached to her instead of their mother and she plans to have a talk with her daughter when she returns from a work trip. Pt expressed her irritation with her inability to remember things and lack of finishing projects around the house. SW suggested writing down appts, tasks to do, and other ideas so she can reference them later if she is interrupted. Pt said she had not thought of that before and she will start using her tablets at home. SW also suggested using the note to organize her thoughts and topics she'd like to discuss with her daughter so she is less likely to approach the conversation with high emotions and blaming. Pt thanked the SW for listening and for her ideas and suggestions. SW will f/u with pt in the next few weeks to see how her conversation went with her daughter and to see if she'd like a f/u with SW and/or oncology psychologist.  "

## 2017-10-19 NOTE — PATIENT INSTRUCTIONS
[facility]  [address]  [phone] phone     [phone] fax  Hours of Operation: Monday- Friday 8:00am- 5:00pm  After hours phone  [phone]  Hematology / Oncology Physicians on call      Dr. Darius Angulo                        Please call with any concerns regarding your appointment today.

## 2017-10-20 DIAGNOSIS — D50.0 IRON DEFICIENCY ANEMIA DUE TO CHRONIC BLOOD LOSS: Primary | ICD-10-CM

## 2017-10-20 PROBLEM — D50.9 IRON DEFICIENCY ANEMIA: Status: ACTIVE | Noted: 2017-10-20

## 2017-10-20 RX ORDER — HEPARIN 100 UNIT/ML
500 SYRINGE INTRAVENOUS
Status: CANCELLED | OUTPATIENT
Start: 2017-10-20

## 2017-10-20 RX ORDER — SODIUM CHLORIDE 0.9 % (FLUSH) 0.9 %
10 SYRINGE (ML) INJECTION
Status: CANCELLED | OUTPATIENT
Start: 2017-10-20

## 2017-11-01 ENCOUNTER — INFUSION (OUTPATIENT)
Dept: INFUSION THERAPY | Facility: HOSPITAL | Age: 72
End: 2017-11-01
Attending: INTERNAL MEDICINE
Payer: MEDICARE

## 2017-11-01 ENCOUNTER — SOCIAL WORK (OUTPATIENT)
Dept: HEMATOLOGY/ONCOLOGY | Facility: CLINIC | Age: 72
End: 2017-11-01

## 2017-11-01 VITALS
RESPIRATION RATE: 16 BRPM | DIASTOLIC BLOOD PRESSURE: 76 MMHG | HEART RATE: 62 BPM | OXYGEN SATURATION: 98 % | TEMPERATURE: 98 F | SYSTOLIC BLOOD PRESSURE: 156 MMHG

## 2017-11-01 DIAGNOSIS — D46.9 MDS (MYELODYSPLASTIC SYNDROME): Primary | ICD-10-CM

## 2017-11-01 DIAGNOSIS — D50.0 IRON DEFICIENCY ANEMIA DUE TO CHRONIC BLOOD LOSS: ICD-10-CM

## 2017-11-01 PROCEDURE — 63600175 PHARM REV CODE 636 W HCPCS: Mod: PO | Performed by: INTERNAL MEDICINE

## 2017-11-01 PROCEDURE — 96375 TX/PRO/DX INJ NEW DRUG ADDON: CPT | Mod: PO

## 2017-11-01 PROCEDURE — 96372 THER/PROPH/DIAG INJ SC/IM: CPT | Mod: PO

## 2017-11-01 PROCEDURE — 25000003 PHARM REV CODE 250: Mod: PO | Performed by: INTERNAL MEDICINE

## 2017-11-01 PROCEDURE — 96365 THER/PROPH/DIAG IV INF INIT: CPT | Mod: PO

## 2017-11-01 RX ORDER — HEPARIN 100 UNIT/ML
500 SYRINGE INTRAVENOUS
Status: CANCELLED | OUTPATIENT
Start: 2017-11-01

## 2017-11-01 RX ORDER — SODIUM CHLORIDE 0.9 % (FLUSH) 0.9 %
10 SYRINGE (ML) INJECTION
Status: CANCELLED | OUTPATIENT
Start: 2017-11-01

## 2017-11-01 RX ADMIN — FERUMOXYTOL 510 MG: 510 INJECTION INTRAVENOUS at 10:11

## 2017-11-01 RX ADMIN — ERYTHROPOIETIN 40000 UNITS: 40000 INJECTION, SOLUTION INTRAVENOUS; SUBCUTANEOUS at 10:11

## 2017-11-01 RX ADMIN — METHYLPREDNISOLONE SODIUM SUCCINATE 125 MG: 125 INJECTION, POWDER, FOR SOLUTION INTRAMUSCULAR; INTRAVENOUS at 10:11

## 2017-11-01 NOTE — PATIENT INSTRUCTIONS
[facility]  [address]  [phone] phone     [phone] fax  Hours of Operation: Monday- Friday 8:00am- 5:00pm  After hours phone  [phone]  Hematology / Oncology Physicians on call      Dr. Darius Angulo                        Please call with any concerns regarding your appointment today.

## 2017-11-01 NOTE — PLAN OF CARE
Problem: Patient Care Overview  Goal: Plan of Care Review  Outcome: Ongoing (interventions implemented as appropriate)  Pt states she feels ok today

## 2017-11-01 NOTE — NURSING
Patient stated some mild burning and stinging in left AC peripheral line. No drainage, redness, or swelling. Line D/C. No complaints after line was removed.        Injection given without difficulties.Bandaid applied. Patient instructed to stay in the clinic for 15 minutes. Patient verbalized understanding and will notify nurse with any complaints.

## 2017-11-01 NOTE — PROGRESS NOTES
"SW met with pt in infusion room while she received her treatment today. Pt reported she is better since the last time we met, but she continues to have episodes of depressive symptoms. Pt explained she was able to speak with her daughter about her concerns. Pt reported her daughter took the conversation well and she indicated to the pt that she will do better with being present for her children. Pt reported so far she has been coming home earlier and she took the children to a Halloween event at the [facility] last night. Pt reported it was nice to have a night off and spend some time with one of her sons who recently has moved in with them temporarily. Pt explained her son recently obtained a job and she was relieved about that. Pt explained despite having many people surrounding her, she continues to feel lonely. Pt misses have a deep connection with someone like she used to have with her daughter and her . Pt talked about a friend who is "like a sister to me" that she used to have dinner or coffee with occasionally. Pt reported she has lost touch with her but she wants to reach out and build on that relationship again. Pt will call that friend this week and make plans with her. Pt mentioned she "should" call her sisters and she "should" join a [employer] group. We discussed these expectations she puts on herself and talked about doing things she wants to do instead of things she think she should. SW will f/u with pt next week when she RTC for treatment.   "

## 2017-11-07 ENCOUNTER — INFUSION (OUTPATIENT)
Dept: INFUSION THERAPY | Facility: HOSPITAL | Age: 72
End: 2017-11-07
Attending: INTERNAL MEDICINE
Payer: MEDICARE

## 2017-11-07 VITALS
OXYGEN SATURATION: 98 % | RESPIRATION RATE: 18 BRPM | DIASTOLIC BLOOD PRESSURE: 79 MMHG | BODY MASS INDEX: 32.15 KG/M2 | TEMPERATURE: 98 F | HEART RATE: 70 BPM | HEIGHT: 63 IN | SYSTOLIC BLOOD PRESSURE: 160 MMHG | WEIGHT: 181.44 LBS

## 2017-11-07 DIAGNOSIS — D50.0 IRON DEFICIENCY ANEMIA DUE TO CHRONIC BLOOD LOSS: Primary | ICD-10-CM

## 2017-11-07 PROCEDURE — 96365 THER/PROPH/DIAG IV INF INIT: CPT | Mod: PO

## 2017-11-07 PROCEDURE — 63600175 PHARM REV CODE 636 W HCPCS: Mod: PO | Performed by: INTERNAL MEDICINE

## 2017-11-07 PROCEDURE — 96375 TX/PRO/DX INJ NEW DRUG ADDON: CPT | Mod: PO

## 2017-11-07 PROCEDURE — 25000003 PHARM REV CODE 250: Mod: PO | Performed by: INTERNAL MEDICINE

## 2017-11-07 RX ORDER — SODIUM CHLORIDE 0.9 % (FLUSH) 0.9 %
10 SYRINGE (ML) INJECTION
Status: CANCELLED | OUTPATIENT
Start: 2017-11-07

## 2017-11-07 RX ORDER — HEPARIN 100 UNIT/ML
500 SYRINGE INTRAVENOUS
Status: CANCELLED | OUTPATIENT
Start: 2017-11-07

## 2017-11-07 RX ORDER — SODIUM CHLORIDE 0.9 % (FLUSH) 0.9 %
10 SYRINGE (ML) INJECTION
Status: DISCONTINUED | OUTPATIENT
Start: 2017-11-07 | End: 2017-11-07 | Stop reason: HOSPADM

## 2017-11-07 RX ADMIN — FERUMOXYTOL 510 MG: 510 INJECTION INTRAVENOUS at 09:11

## 2017-11-07 RX ADMIN — METHYLPREDNISOLONE SODIUM SUCCINATE 125 MG: 125 INJECTION, POWDER, FOR SOLUTION INTRAMUSCULAR; INTRAVENOUS at 09:11

## 2017-11-07 RX ADMIN — SODIUM CHLORIDE: 9 INJECTION, SOLUTION INTRAVENOUS at 09:11

## 2017-11-07 NOTE — PLAN OF CARE
"Problem: Patient Care Overview  Goal: Plan of Care Review  Outcome: Ongoing (interventions implemented as appropriate)  Pt. Stated,"I'm doing pretty good."      "

## 2017-11-07 NOTE — PATIENT INSTRUCTIONS
[facility]  [address]  [phone] phone     [phone] fax  Hours of Operation: Monday- Friday 8:00am- 5:00pm  After hours phone  [phone]  Hematology / Oncology Physicians on call      Dr. Darius Angulo                        Please call with any concerns regarding your appointment today.

## 2017-11-08 ENCOUNTER — TELEPHONE (OUTPATIENT)
Dept: HEMATOLOGY/ONCOLOGY | Facility: CLINIC | Age: 72
End: 2017-11-08

## 2017-11-08 NOTE — TELEPHONE ENCOUNTER
SW attempted to call pt today to f/u since she was sleeping during her treatment yesterday in clinic. SW was unable to leave a voicemail and will try to call again later this week.

## 2017-11-10 ENCOUNTER — TELEPHONE (OUTPATIENT)
Dept: HEMATOLOGY/ONCOLOGY | Facility: CLINIC | Age: 72
End: 2017-11-10

## 2017-11-10 NOTE — TELEPHONE ENCOUNTER
SW called pt to f/u. Pt reported she is doing well and she's had two good days in a row. She expressed hope for the good days to continue. Pt explained she spoke with her friend and they have plans to meet up next week either for dinner or to go out somewhere to catch up. Pt also reported she talked with two of her sisters. Pt is looking forward to going to work tomorrow and [employer] on Sunday. Pt thanked MARK for calling to check on her. MARK will f/u with pt in clinic on Wednesday.

## 2017-11-15 ENCOUNTER — INFUSION (OUTPATIENT)
Dept: INFUSION THERAPY | Facility: HOSPITAL | Age: 72
End: 2017-11-15
Attending: INTERNAL MEDICINE
Payer: MEDICARE

## 2017-11-15 VITALS
OXYGEN SATURATION: 96 % | TEMPERATURE: 99 F | BODY MASS INDEX: 32.15 KG/M2 | HEART RATE: 73 BPM | DIASTOLIC BLOOD PRESSURE: 75 MMHG | SYSTOLIC BLOOD PRESSURE: 162 MMHG | HEIGHT: 63 IN | WEIGHT: 181.44 LBS | RESPIRATION RATE: 18 BRPM

## 2017-11-15 DIAGNOSIS — D46.9 MDS (MYELODYSPLASTIC SYNDROME): Primary | ICD-10-CM

## 2017-11-15 PROCEDURE — 96372 THER/PROPH/DIAG INJ SC/IM: CPT | Mod: PO

## 2017-11-15 PROCEDURE — 63600175 PHARM REV CODE 636 W HCPCS: Mod: PO | Performed by: INTERNAL MEDICINE

## 2017-11-15 RX ADMIN — ERYTHROPOIETIN 40000 UNITS: 40000 INJECTION, SOLUTION INTRAVENOUS; SUBCUTANEOUS at 09:11

## 2017-11-15 NOTE — PATIENT INSTRUCTIONS
[facility]  [address]  [phone] phone     [phone] fax  Hours of Operation: Monday- Friday 8:00am- 5:00pm  After hours phone  [phone]  Hematology / Oncology Physicians on call      Dr. Darius Angulo                        Please call with any concerns regarding your appointment today.

## 2017-11-24 RX ORDER — TRAMADOL HYDROCHLORIDE 50 MG/1
TABLET ORAL
Qty: 30 TABLET | Refills: 0 | Status: SHIPPED | OUTPATIENT
Start: 2017-11-24 | End: 2018-01-11 | Stop reason: SDUPTHER

## 2017-12-13 ENCOUNTER — LAB VISIT (OUTPATIENT)
Dept: LAB | Facility: HOSPITAL | Age: 72
End: 2017-12-13
Attending: INTERNAL MEDICINE
Payer: MEDICARE

## 2017-12-13 ENCOUNTER — OFFICE VISIT (OUTPATIENT)
Dept: HEMATOLOGY/ONCOLOGY | Facility: CLINIC | Age: 72
End: 2017-12-13
Payer: MEDICARE

## 2017-12-13 VITALS
BODY MASS INDEX: 31.99 KG/M2 | SYSTOLIC BLOOD PRESSURE: 150 MMHG | WEIGHT: 180.56 LBS | TEMPERATURE: 97 F | HEART RATE: 76 BPM | HEIGHT: 63 IN | DIASTOLIC BLOOD PRESSURE: 82 MMHG

## 2017-12-13 DIAGNOSIS — D46.9 MDS (MYELODYSPLASTIC SYNDROME): Primary | ICD-10-CM

## 2017-12-13 DIAGNOSIS — D46.9 MDS (MYELODYSPLASTIC SYNDROME): ICD-10-CM

## 2017-12-13 DIAGNOSIS — D46.4 REFRACTORY ANEMIA: ICD-10-CM

## 2017-12-13 LAB
BASOPHILS # BLD AUTO: 0.01 K/UL
BASOPHILS NFR BLD: 0.1 %
DIFFERENTIAL METHOD: ABNORMAL
EOSINOPHIL # BLD AUTO: 0.1 K/UL
EOSINOPHIL NFR BLD: 0.8 %
ERYTHROCYTE [DISTWIDTH] IN BLOOD BY AUTOMATED COUNT: 14.4 %
FERRITIN SERPL-MCNC: 1137 NG/ML
HCT VFR BLD AUTO: 32.5 %
HGB BLD-MCNC: 10.9 G/DL
IRON SERPL-MCNC: 71 UG/DL
LYMPHOCYTES # BLD AUTO: 4.5 K/UL
LYMPHOCYTES NFR BLD: 49.6 %
MCH RBC QN AUTO: 27.6 PG
MCHC RBC AUTO-ENTMCNC: 33.5 G/DL
MCV RBC AUTO: 82 FL
MONOCYTES # BLD AUTO: 0.6 K/UL
MONOCYTES NFR BLD: 6 %
NEUTROPHILS # BLD AUTO: 4 K/UL
NEUTROPHILS NFR BLD: 43.5 %
PLATELET # BLD AUTO: 188 K/UL
PMV BLD AUTO: 9.7 FL
RBC # BLD AUTO: 3.95 M/UL
SATURATED IRON: 25 %
TOTAL IRON BINDING CAPACITY: 281 UG/DL
TRANSFERRIN SERPL-MCNC: 190 MG/DL
WBC # BLD AUTO: 9.1 K/UL

## 2017-12-13 PROCEDURE — 83540 ASSAY OF IRON: CPT

## 2017-12-13 PROCEDURE — 82728 ASSAY OF FERRITIN: CPT

## 2017-12-13 PROCEDURE — 85025 COMPLETE CBC W/AUTO DIFF WBC: CPT

## 2017-12-13 PROCEDURE — 99499 UNLISTED E&M SERVICE: CPT | Mod: S$GLB,,, | Performed by: INTERNAL MEDICINE

## 2017-12-13 PROCEDURE — 99214 OFFICE O/P EST MOD 30 MIN: CPT | Mod: S$GLB,,, | Performed by: INTERNAL MEDICINE

## 2017-12-13 PROCEDURE — 99999 PR PBB SHADOW E&M-EST. PATIENT-LVL III: CPT | Mod: PBBFAC,,, | Performed by: INTERNAL MEDICINE

## 2017-12-13 PROCEDURE — 36415 COLL VENOUS BLD VENIPUNCTURE: CPT

## 2017-12-13 NOTE — PROGRESS NOTES
Subjective:       Patient ID: Adelina Caro is a 71 y.o. female.    Chief Complaint: Results (Myelodysplasia) and Anemia    HPI 71-year-old female returns history of myelodysplasia receiving supportive therapy patient returns for continuation of medication patient states that she feels dramatically better with Procrit administration once she is likely hemoglobin is 10.9    Past Medical History:   Diagnosis Date    Anemia     Anxiety     Arthritis     Asthma     Back pain     Benign colonic polyp     Bulging disc     low back    Cervicalgia 9/17/2013    DDD (degenerative disc disease), lumbar     Depression     Diabetes mellitus     borderline    Diverticulosis     Diverticulosis     Hiatal hernia 3/10/2015    S/P hernia repair x  2    Hypertension     Pneumonia     Polyneuropathy     Postmenopausal     no history of abnormal pap smear    Right rotator cuff tear     right shoulder    Type 2 diabetes mellitus without complication      Family History   Problem Relation Age of Onset    Colon cancer Sister     Colon cancer Sister     Ovarian cancer Sister     Hypertension Mother     Heart disease Mother      MI    Breast cancer Mother     Cataracts Mother     Diabetes Sister     Cancer Sister      unknown    Heart disease Sister      MI/CAD    Stroke Neg Hx     Dementia Neg Hx      Social History     Social History    Marital status:      Spouse name: N/A    Number of children: 3    Years of education: N/A     Occupational History    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 [employer]    [employer]     Social History Main Topics    Smoking status: Former Smoker     Packs/day: 1.00     Years: 3.00     Types: Cigarettes     Quit date: 8/22/1985    Smokeless tobacco: Never Used    Alcohol use No    Drug use: No    Sexual activity: No     Other Topics Concern    Not on file     Social History Narrative    She wears seatbelt.     Past Surgical History:    Procedure Laterality Date    BLADDER SURGERY      bone marrow biopsy      COLONOSCOPY      HERNIA REPAIR      hernia surgery      left lower abd wall;  mesh used    TOTAL ABDOMINAL HYSTERECTOMY W/ BILATERAL SALPINGOOPHORECTOMY      TUBAL LIGATION         Labs:  Lab Results   Component Value Date    WBC 9.10 12/13/2017    HGB 10.9 (L) 12/13/2017    HCT 32.5 (L) 12/13/2017    MCV 82 12/13/2017     12/13/2017     BMP  Lab Results   Component Value Date     10/19/2017    K 4.5 10/19/2017     10/19/2017    CO2 30 (H) 10/19/2017    BUN 17 10/19/2017    CREATININE 0.8 10/19/2017    CALCIUM 9.6 10/19/2017    ANIONGAP 6 (L) 10/19/2017    ESTGFRAFRICA >60 10/19/2017    EGFRNONAA >60 10/19/2017     Lab Results   Component Value Date    ALT 10 02/23/2017    AST 13 02/23/2017    ALKPHOS 55 02/23/2017    BILITOT 0.5 02/23/2017       Lab Results   Component Value Date    IRON 49 10/19/2017    TIBC 268 10/19/2017    FERRITIN 579 (H) 10/19/2017     Lab Results   Component Value Date    [id number] 537 01/12/2017     No results found for: FOLATE  Lab Results   Component Value Date    TSH 1.063 08/09/2017         Review of Systems   Constitutional: Negative for activity change, appetite change, chills, diaphoresis, fatigue, fever and unexpected weight change.   HENT: Negative for congestion, dental problem, drooling, ear discharge, ear pain, facial swelling, hearing loss, mouth sores, nosebleeds, postnasal drip, rhinorrhea, sinus pressure, sneezing, sore throat, tinnitus, trouble swallowing and voice change.    Eyes: Negative for photophobia, pain, discharge, redness, itching and visual disturbance.   Respiratory: Negative for cough, choking, chest tightness, shortness of breath, wheezing and stridor.    Cardiovascular: Negative for chest pain, palpitations and leg swelling.   Gastrointestinal: Negative for abdominal distention, abdominal pain, anal bleeding, blood in stool, constipation, diarrhea, nausea,  rectal pain and vomiting.   Endocrine: Negative for cold intolerance, heat intolerance, polydipsia, polyphagia and polyuria.   Genitourinary: Negative for decreased urine volume, difficulty urinating, dyspareunia, dysuria, enuresis, flank pain, frequency, genital sores, hematuria, menstrual problem, pelvic pain, urgency, vaginal bleeding, vaginal discharge and vaginal pain.   Musculoskeletal: Negative for arthralgias, back pain, gait problem, joint swelling, myalgias, neck pain and neck stiffness.   Skin: Negative for color change, pallor and rash.   Allergic/Immunologic: Negative for environmental allergies, food allergies and immunocompromised state.   Neurological: Negative for dizziness, tremors, seizures, syncope, facial asymmetry, speech difficulty, weakness, light-headedness, numbness and headaches.   Hematological: Negative for adenopathy. Does not bruise/bleed easily.   Psychiatric/Behavioral: Positive for dysphoric mood. Negative for agitation, behavioral problems, confusion, decreased concentration, hallucinations, self-injury, sleep disturbance and suicidal ideas. The patient is nervous/anxious. The patient is not hyperactive.        Objective:      Physical Exam   Constitutional: She is oriented to person, place, and time. She appears well-developed and well-nourished. She appears distressed.   HENT:   Head: Normocephalic and atraumatic.   Right Ear: External ear normal.   Left Ear: External ear normal.   Nose: Nose normal. Right sinus exhibits no maxillary sinus tenderness and no frontal sinus tenderness. Left sinus exhibits no maxillary sinus tenderness and no frontal sinus tenderness.   Mouth/Throat: Oropharynx is clear and moist. No oropharyngeal exudate.   Eyes: Conjunctivae, EOM and lids are normal. Pupils are equal, round, and reactive to light. Right eye exhibits no discharge. Left eye exhibits no discharge. Right conjunctiva is not injected. Right conjunctiva has no hemorrhage. Left conjunctiva  is not injected. Left conjunctiva has no hemorrhage. No scleral icterus.   Neck: Normal range of motion. Neck supple. No JVD present. No tracheal deviation present. No thyromegaly present.   Cardiovascular: Normal rate and regular rhythm.    Pulmonary/Chest: Effort normal. No stridor. No respiratory distress. She exhibits no tenderness.   Abdominal: Soft. She exhibits no distension and no mass. There is no splenomegaly or hepatomegaly. There is no tenderness. There is no rebound.   Musculoskeletal: Normal range of motion. She exhibits no edema or tenderness.   Lymphadenopathy:     She has no cervical adenopathy.     She has no axillary adenopathy.        Right: No supraclavicular adenopathy present.        Left: No supraclavicular adenopathy present.   Neurological: She is alert and oriented to person, place, and time. No cranial nerve deficit. Coordination normal.   Skin: Skin is dry. No rash noted. She is not diaphoretic. No erythema.   Psychiatric: She has a normal mood and affect. Her behavior is normal. Judgment and thought content normal.   Vitals reviewed.          Assessment:      1. MDS (myelodysplastic syndrome)    2. Refractory anemia           Plan:   Patient's hemoglobin is 10.9 hematocrit 32.4 will hold Procrit today return in 3 weeks with repeat CBC continuation of medication

## 2018-01-08 ENCOUNTER — LAB VISIT (OUTPATIENT)
Dept: LAB | Facility: HOSPITAL | Age: 73
End: 2018-01-08
Attending: INTERNAL MEDICINE
Payer: MEDICARE

## 2018-01-08 ENCOUNTER — TELEPHONE (OUTPATIENT)
Dept: HEMATOLOGY/ONCOLOGY | Facility: CLINIC | Age: 73
End: 2018-01-08

## 2018-01-08 ENCOUNTER — OFFICE VISIT (OUTPATIENT)
Dept: HEMATOLOGY/ONCOLOGY | Facility: CLINIC | Age: 73
End: 2018-01-08
Payer: MEDICARE

## 2018-01-08 ENCOUNTER — INFUSION (OUTPATIENT)
Dept: INFUSION THERAPY | Facility: HOSPITAL | Age: 73
End: 2018-01-08
Attending: INTERNAL MEDICINE
Payer: MEDICARE

## 2018-01-08 VITALS
DIASTOLIC BLOOD PRESSURE: 80 MMHG | TEMPERATURE: 98 F | BODY MASS INDEX: 32.89 KG/M2 | SYSTOLIC BLOOD PRESSURE: 172 MMHG | WEIGHT: 182.75 LBS | OXYGEN SATURATION: 98 % | HEART RATE: 68 BPM

## 2018-01-08 DIAGNOSIS — D46.9 MDS (MYELODYSPLASTIC SYNDROME): Primary | ICD-10-CM

## 2018-01-08 DIAGNOSIS — D46.9 MDS (MYELODYSPLASTIC SYNDROME): ICD-10-CM

## 2018-01-08 LAB
BASOPHILS # BLD AUTO: 0.03 K/UL
BASOPHILS NFR BLD: 0.4 %
DIFFERENTIAL METHOD: ABNORMAL
EOSINOPHIL # BLD AUTO: 0.1 K/UL
EOSINOPHIL NFR BLD: 1.2 %
ERYTHROCYTE [DISTWIDTH] IN BLOOD BY AUTOMATED COUNT: 14.2 %
HCT VFR BLD AUTO: 27.4 %
HGB BLD-MCNC: 9.4 G/DL
LYMPHOCYTES # BLD AUTO: 3.4 K/UL
LYMPHOCYTES NFR BLD: 49.9 %
MCH RBC QN AUTO: 28.8 PG
MCHC RBC AUTO-ENTMCNC: 34.3 G/DL
MCV RBC AUTO: 84 FL
MONOCYTES # BLD AUTO: 0.5 K/UL
MONOCYTES NFR BLD: 7 %
NEUTROPHILS # BLD AUTO: 2.8 K/UL
NEUTROPHILS NFR BLD: 41.5 %
PLATELET # BLD AUTO: 172 K/UL
PMV BLD AUTO: 9.6 FL
RBC # BLD AUTO: 3.26 M/UL
WBC # BLD AUTO: 6.75 K/UL

## 2018-01-08 PROCEDURE — 99499 UNLISTED E&M SERVICE: CPT | Mod: S$GLB,,, | Performed by: INTERNAL MEDICINE

## 2018-01-08 PROCEDURE — 96372 THER/PROPH/DIAG INJ SC/IM: CPT | Mod: PO

## 2018-01-08 PROCEDURE — 85025 COMPLETE CBC W/AUTO DIFF WBC: CPT | Mod: PO

## 2018-01-08 PROCEDURE — 36415 COLL VENOUS BLD VENIPUNCTURE: CPT | Mod: PO

## 2018-01-08 PROCEDURE — 99214 OFFICE O/P EST MOD 30 MIN: CPT | Mod: 25,S$GLB,, | Performed by: INTERNAL MEDICINE

## 2018-01-08 PROCEDURE — 99999 PR PBB SHADOW E&M-EST. PATIENT-LVL III: CPT | Mod: PBBFAC,,, | Performed by: INTERNAL MEDICINE

## 2018-01-08 PROCEDURE — 63600175 PHARM REV CODE 636 W HCPCS: Mod: JG,PO,EC | Performed by: INTERNAL MEDICINE

## 2018-01-08 RX ADMIN — ERYTHROPOIETIN 40000 UNITS: 40000 INJECTION, SOLUTION INTRAVENOUS; SUBCUTANEOUS at 10:01

## 2018-01-08 NOTE — PATIENT INSTRUCTIONS
[facility]  [address]  [address]  [phone] phone     [phone] fax  Hours of Operation: Monday- Friday 8:00am- 5:00pm  After hours phone  [phone]  Hematology / Oncology Physicians on call      Dr. Darius Angulo                        Please call with any concerns regarding your appointment today.  FALL PREVENTION   Falls often occur due to slipping, tripping or losing your balance. Here are ways to reduce your risk of falling again.   Was there anything that caused your fall that can be fixed, removed or replaced?   Make your home safe by keeping walkways clear of objects you may trip over.   Use non-slip pads under rugs.   Do not walk in poorly lit areas.   Do not stand on chairs or wobbly ladders.   Use caution when reaching overhead or looking upward. This position can cause a loss of balance.   Be sure your shoes fit properly, have non-slip bottoms and are in good condition.   Be cautious when going up and down stairs, curbs, and when walking on uneven sidewalks.   If your balance is poor, consider using a cane or walker.   If your fall was related to alcohol use, stop or limit alcohol intake.   If your fall was related to use of sleeping medicines, talk to your doctor about this. You may need to reduce your dosage at bedtime if you awaken during the night to go to the bathroom.   To reduce the need for nighttime bathroom trips:   Avoid drinking fluids for several hours before going to bed   Empty your bladder before going to bed   Men can keep a urinal at the bedside   © 7802-5679 [employer], [address] [place] [address], [place] [address]. All rights reserved. This information is not intended as a substitute for professional medical care. Always follow your healthcare professional's instructions.

## 2018-01-08 NOTE — PROGRESS NOTES
Subjective:       Patient ID: Adelina Caro is a 72 y.o. female.    Chief Complaint: Results and Anemia    HPI 72-year-old female history of anemia secondary to myelodysplasia receiving Procrit administration held because of increased hemoglobin    Past Medical History:   Diagnosis Date    Anemia     Anxiety     Arthritis     Asthma     Back pain     Benign colonic polyp     Bulging disc     low back    Cervicalgia 9/17/2013    DDD (degenerative disc disease), lumbar     Depression     Diabetes mellitus     borderline    Diverticulosis     Diverticulosis     Hiatal hernia 3/10/2015    S/P hernia repair x  2    Hypertension     Pneumonia     Polyneuropathy     Postmenopausal     no history of abnormal pap smear    Right rotator cuff tear     right shoulder    Type 2 diabetes mellitus without complication      Family History   Problem Relation Age of Onset    Colon cancer Sister     Colon cancer Sister     Ovarian cancer Sister     Hypertension Mother     Heart disease Mother      MI    Breast cancer Mother     Cataracts Mother     Diabetes Sister     Cancer Sister      unknown    Heart disease Sister      MI/CAD    Stroke Neg Hx     Dementia Neg Hx      Social History     Social History    Marital status:      Spouse name: N/A    Number of children: 3    Years of education: N/A     Occupational History    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     Past Surgical History:   Procedure Laterality Date    BLADDER SURGERY      bone marrow biopsy      COLONOSCOPY      HERNIA REPAIR      hernia surgery       left lower abd wall;  mesh used    TOTAL ABDOMINAL HYSTERECTOMY W/ BILATERAL SALPINGOOPHORECTOMY      TUBAL LIGATION         Labs:  Lab Results   Component Value Date    WBC 6.75 01/08/2018    HGB 9.4 (L) 01/08/2018    HCT 27.4 (L) 01/08/2018    MCV 84 01/08/2018     01/08/2018     BMP  Lab Results   Component Value Date     10/19/2017    K 4.5 10/19/2017     10/19/2017    CO2 30 (H) 10/19/2017    BUN 17 10/19/2017    CREATININE 0.8 10/19/2017    CALCIUM 9.6 10/19/2017    ANIONGAP 6 (L) 10/19/2017    ESTGFRAFRICA >60 10/19/2017    EGFRNONAA >60 10/19/2017     Lab Results   Component Value Date    ALT 10 02/23/2017    AST 13 02/23/2017    ALKPHOS 55 02/23/2017    BILITOT 0.5 02/23/2017       Lab Results   Component Value Date    IRON 71 12/13/2017    TIBC 281 12/13/2017    FERRITIN 1,137 (H) 12/13/2017     Lab Results   Component Value Date    [id number] 537 01/12/2017     No results found for: FOLATE  Lab Results   Component Value Date    TSH 1.063 08/09/2017         Review of Systems   Constitutional: Negative for activity change, appetite change, chills, diaphoresis, fatigue, fever and unexpected weight change.   HENT: Negative for congestion, dental problem, drooling, ear discharge, ear pain, facial swelling, hearing loss, mouth sores, nosebleeds, postnasal drip, rhinorrhea, sinus pressure, sneezing, sore throat, tinnitus, trouble swallowing and voice change.    Eyes: Negative for photophobia, pain, discharge, redness, itching and visual disturbance.   Respiratory: Negative for cough, choking, chest tightness, shortness of breath, wheezing and stridor.    Cardiovascular: Negative for chest pain, palpitations and leg swelling.   Gastrointestinal: Negative for abdominal distention, abdominal pain, anal bleeding, blood in stool, constipation, diarrhea, nausea, rectal pain and vomiting.   Endocrine: Negative for cold intolerance, heat intolerance, polydipsia, polyphagia and polyuria.    Genitourinary: Negative for decreased urine volume, difficulty urinating, dyspareunia, dysuria, enuresis, flank pain, frequency, genital sores, hematuria, menstrual problem, pelvic pain, urgency, vaginal bleeding, vaginal discharge and vaginal pain.   Musculoskeletal: Negative for arthralgias, back pain, gait problem, joint swelling, myalgias, neck pain and neck stiffness.   Skin: Negative for color change, pallor and rash.   Allergic/Immunologic: Negative for environmental allergies, food allergies and immunocompromised state.   Neurological: Negative for dizziness, tremors, seizures, syncope, facial asymmetry, speech difficulty, weakness, light-headedness, numbness and headaches.   Hematological: Negative for adenopathy. Does not bruise/bleed easily.   Psychiatric/Behavioral: Negative for agitation, behavioral problems, confusion, decreased concentration, dysphoric mood, hallucinations, self-injury, sleep disturbance and suicidal ideas. The patient is not nervous/anxious and is not hyperactive.        Objective:      Physical Exam   Constitutional: She is oriented to person, place, and time. She appears well-developed and well-nourished. No distress.   HENT:   Head: Normocephalic and atraumatic.   Right Ear: External ear normal.   Left Ear: External ear normal.   Nose: Nose normal. Right sinus exhibits no maxillary sinus tenderness and no frontal sinus tenderness. Left sinus exhibits no maxillary sinus tenderness and no frontal sinus tenderness.   Mouth/Throat: Oropharynx is clear and moist. No oropharyngeal exudate.   Eyes: Conjunctivae, EOM and lids are normal. Pupils are equal, round, and reactive to light. Right eye exhibits no discharge. Left eye exhibits no discharge. Right conjunctiva is not injected. Right conjunctiva has no hemorrhage. Left conjunctiva is not injected. Left conjunctiva has no hemorrhage. No scleral icterus.   Neck: Normal range of motion. Neck supple. No JVD present. No tracheal  deviation present. No thyromegaly present.   Cardiovascular: Normal rate and regular rhythm.    Pulmonary/Chest: Effort normal. No stridor. No respiratory distress. She exhibits no tenderness.   Abdominal: Soft. She exhibits no distension and no mass. There is no splenomegaly or hepatomegaly. There is no tenderness. There is no rebound.   Musculoskeletal: Normal range of motion. She exhibits no edema or tenderness.   Lymphadenopathy:     She has no cervical adenopathy.     She has no axillary adenopathy.        Right: No supraclavicular adenopathy present.        Left: No supraclavicular adenopathy present.   Neurological: She is alert and oriented to person, place, and time. No cranial nerve deficit. Coordination normal.   Skin: Skin is dry. No rash noted. She is not diaphoretic. No erythema.   Psychiatric: She has a normal mood and affect. Her behavior is normal. Judgment and thought content normal.   Vitals reviewed.          Assessment:      1. MDS (myelodysplastic syndrome)           Plan:   Hemoglobin below 10 g start back on Procrit 40,000 units every 2 weeks ×3 return 6 weeks with CBC iron status to either see myself or nurse practitioners to maintain hemoglobin between 10 and 11 g

## 2018-01-11 ENCOUNTER — TELEPHONE (OUTPATIENT)
Dept: HEMATOLOGY/ONCOLOGY | Facility: CLINIC | Age: 73
End: 2018-01-11

## 2018-01-11 NOTE — TELEPHONE ENCOUNTER
SW attempted to call pt regarding financial assistance for her Procrit. SW left message with her contact info asking pt to return call in order to complete pt assistance application over the phone. MARK will f/u with a second call later this week.    ADDENDUM: Pt returned call. SW completed application online with pt's assistance via phone. Pt was awarded a $7,000 john through the [facility] Co-Pay Relief Fund to help cover her Procrit injections she receives every two weeks. 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 SW listened and provided emotional support. Pt thanked MARK for her help. MARK will f/u with pt in person when she RTC on January 22.

## 2018-01-14 RX ORDER — TRAMADOL HYDROCHLORIDE 50 MG/1
50 TABLET ORAL DAILY PRN
Qty: 30 TABLET | Refills: 0 | Status: SHIPPED | OUTPATIENT
Start: 2018-01-14

## 2018-01-22 ENCOUNTER — INFUSION (OUTPATIENT)
Dept: INFUSION THERAPY | Facility: HOSPITAL | Age: 73
End: 2018-01-22
Attending: INTERNAL MEDICINE
Payer: MEDICARE

## 2018-01-22 VITALS
DIASTOLIC BLOOD PRESSURE: 83 MMHG | TEMPERATURE: 99 F | HEART RATE: 77 BPM | RESPIRATION RATE: 16 BRPM | SYSTOLIC BLOOD PRESSURE: 160 MMHG | OXYGEN SATURATION: 98 %

## 2018-01-22 DIAGNOSIS — D46.9 MDS (MYELODYSPLASTIC SYNDROME): Primary | ICD-10-CM

## 2018-01-22 PROCEDURE — 96372 THER/PROPH/DIAG INJ SC/IM: CPT | Mod: PO

## 2018-01-22 PROCEDURE — 63600175 PHARM REV CODE 636 W HCPCS: Mod: JG,PO,EC | Performed by: INTERNAL MEDICINE

## 2018-01-22 RX ORDER — PANTOPRAZOLE SODIUM 40 MG/1
TABLET, DELAYED RELEASE ORAL
Qty: 30 TABLET | Refills: 6 | OUTPATIENT
Start: 2018-01-22

## 2018-01-22 RX ADMIN — ERYTHROPOIETIN 40000 UNITS: 40000 INJECTION, SOLUTION INTRAVENOUS; SUBCUTANEOUS at 09:01

## 2018-01-22 NOTE — PATIENT INSTRUCTIONS
[facility]  [address]  [phone] phone     [phone] fax  Hours of Operation: Monday- Friday 8:00am- 5:00pm  After hours phone  [phone]  Hematology / Oncology Physicians on call      Dr. Darius Angulo                        Please call with any concerns regarding your appointment today.

## 2018-02-04 RX ORDER — ESCITALOPRAM OXALATE 10 MG/1
10 TABLET ORAL DAILY
Qty: 90 TABLET | Refills: 1 | Status: SHIPPED | OUTPATIENT
Start: 2018-02-04 | End: 2019-02-04

## 2018-02-05 ENCOUNTER — SOCIAL WORK (OUTPATIENT)
Dept: HEMATOLOGY/ONCOLOGY | Facility: CLINIC | Age: 73
End: 2018-02-05

## 2018-02-05 ENCOUNTER — INFUSION (OUTPATIENT)
Dept: INFUSION THERAPY | Facility: HOSPITAL | Age: 73
End: 2018-02-05
Attending: INTERNAL MEDICINE
Payer: MEDICARE

## 2018-02-05 VITALS
SYSTOLIC BLOOD PRESSURE: 141 MMHG | RESPIRATION RATE: 18 BRPM | DIASTOLIC BLOOD PRESSURE: 79 MMHG | OXYGEN SATURATION: 96 % | HEART RATE: 71 BPM | TEMPERATURE: 98 F

## 2018-02-05 DIAGNOSIS — D46.9 MDS (MYELODYSPLASTIC SYNDROME): Primary | ICD-10-CM

## 2018-02-05 PROCEDURE — 63600175 PHARM REV CODE 636 W HCPCS: Mod: EC,JG,PO | Performed by: INTERNAL MEDICINE

## 2018-02-05 PROCEDURE — 96372 THER/PROPH/DIAG INJ SC/IM: CPT | Mod: PO

## 2018-02-05 RX ADMIN — ERYTHROPOIETIN 40000 UNITS: 40000 INJECTION, SOLUTION INTRAVENOUS; SUBCUTANEOUS at 09:02

## 2018-02-05 NOTE — PROGRESS NOTES
Pt came by today to show SW documents she received from [facility] Co-Pay Relief. Pt asked if she needed to do anything with the ppwk. SW explained the same info had been sent to the Financial Coordinators so that after insurance is processed, the FC will submit claims to [facility] on her behalf. SW suggested meeting with the FC next time she is in clinic (she was out today) to become more comfortable with the process and people working to help her. Pt mentioned meeting with FC in the past, but she will plan to meet with her again when she RTC in two weeks. SW f/u with pt about her job. Pt explained she misses her job, but her grandchildren and son keep her busy. Pt indicated she was actually leaving the clinic to go bring her son to work today. She reported she is doing well, but she indicated she may want to sit with me again in two weeks. SW will f/u with pt then.

## 2018-02-10 ENCOUNTER — OFFICE VISIT (OUTPATIENT)
Dept: URGENT CARE | Facility: CLINIC | Age: 73
End: 2018-02-10
Payer: MEDICARE

## 2018-02-10 VITALS
HEART RATE: 69 BPM | WEIGHT: 180 LBS | TEMPERATURE: 98 F | HEIGHT: 63 IN | BODY MASS INDEX: 31.89 KG/M2 | SYSTOLIC BLOOD PRESSURE: 170 MMHG | OXYGEN SATURATION: 99 % | DIASTOLIC BLOOD PRESSURE: 76 MMHG

## 2018-02-10 DIAGNOSIS — R10.9 FLANK PAIN: ICD-10-CM

## 2018-02-10 DIAGNOSIS — K57.92 DIVERTICULITIS: Primary | ICD-10-CM

## 2018-02-10 LAB
BILIRUB SERPL-MCNC: NORMAL MG/DL
BLOOD URINE, POC: NEGATIVE
COLOR, POC UA: YELLOW
GLUCOSE UR QL STRIP: NORMAL
KETONES UR QL STRIP: NEGATIVE
LEUKOCYTE ESTERASE URINE, POC: NEGATIVE
NITRITE, POC UA: NEGATIVE
PH, POC UA: 7
PROTEIN, POC: NEGATIVE
SPECIFIC GRAVITY, POC UA: 1
UROBILINOGEN, POC UA: NORMAL

## 2018-02-10 PROCEDURE — 1159F MED LIST DOCD IN RCRD: CPT | Mod: S$GLB,,, | Performed by: PHYSICIAN ASSISTANT

## 2018-02-10 PROCEDURE — 81002 URINALYSIS NONAUTO W/O SCOPE: CPT | Mod: S$GLB,,, | Performed by: PHYSICIAN ASSISTANT

## 2018-02-10 PROCEDURE — 99214 OFFICE O/P EST MOD 30 MIN: CPT | Mod: 25,S$GLB,, | Performed by: PHYSICIAN ASSISTANT

## 2018-02-10 PROCEDURE — 1125F AMNT PAIN NOTED PAIN PRSNT: CPT | Mod: S$GLB,,, | Performed by: PHYSICIAN ASSISTANT

## 2018-02-10 PROCEDURE — 3008F BODY MASS INDEX DOCD: CPT | Mod: S$GLB,,, | Performed by: PHYSICIAN ASSISTANT

## 2018-02-10 PROCEDURE — 99999 PR PBB SHADOW E&M-EST. PATIENT-LVL III: CPT | Mod: PBBFAC,,, | Performed by: PHYSICIAN ASSISTANT

## 2018-02-10 RX ORDER — METRONIDAZOLE 500 MG/1
500 TABLET ORAL 3 TIMES DAILY
Qty: 30 TABLET | Refills: 0 | Status: SHIPPED | OUTPATIENT
Start: 2018-02-10 | End: 2018-02-20

## 2018-02-10 RX ORDER — CIPROFLOXACIN 500 MG/1
500 TABLET ORAL 2 TIMES DAILY
Qty: 20 TABLET | Refills: 0 | Status: SHIPPED | OUTPATIENT
Start: 2018-02-10 | End: 2018-02-20

## 2018-02-10 NOTE — PATIENT INSTRUCTIONS
Diverticulitis    Some people get pouches along the wall of the colon as they get older. The pouches, called diverticuli, usually cause no symptoms. If the pouches become blocked, you can get an infection. This infection is called diverticulitis. It causes pain in your lower abdomen and fever. If not treated, it can become a serious condition, causing an abscess to form inside the pouch. The abscess may block the intestinal tract even or rupture, spreading infection throughout the abdomen.  When treatment is started early, oral antibiotics alone may be enough to cure diverticulitis. This method is tried first. But, if you don't improve or if your condition gets worse while using oral antibiotics, you may need to be admitted to the hospital for IV antibiotics. Severe cases may require surgery.  Home care  The following guidelines will help you care for yourself at home:  · During the acute illness, rest and follow your healthcare provider's instructions about diet. Sometimes you will need to follow a clear liquid diet to rest your bowel. Once your symptoms are better, you may be told to follow a low-fiber diet for some time. Include foods like:  ¨ Flake cereal, mashed potatoes, pancakes, waffles, pasta, white bread, rice, applesauce, bananas, eggs, fish, poultry, tofu, and cooked soft vegetables  · Take antibiotics exactly as instructed. Don't miss any doses or stop taking the medication, even if you feel better.  · Monitor your temperature and tell your healthcare provider if you have rising temperatures.  Preventing future attacks  Once you have an episode of diverticulitis, you are at risk for having it again. After you have recovered from this episode, you may be able to lower your risk by eating a high-fiber diet (20 gm/day to 35 gm/day of fiber). This cleans out the colon pouches that already exist and may prevent new ones from forming. Foods high in fiber include fresh fruits and edible peelings, raw or  lightly cooked vegetables, whole grain cereals and breads, dried beans and peas, and bran.  Other steps that can help prevent future attacks include:  · Take your medicines, such as antibiotics, as your healthcare provider says.  · Drink 6 to 8 glasses of water every day, unless told otherwise.  · Use a heating pad or hot water bottle to help abdominal cramping or pain.  · Begin an exercise program. Ask your healthcare provider how to get started. You can benefit from simple activities such as walking or gardening.  · Treat diarrhea with a bland diet. Start with liquids only; then slowly add fiber over time.  · Watch for changes in your bowel movements (constipation to diarrhea). Avoid constipation by eating a high fiber diet and taking a stool softener if needed.  · Get plenty of rest and sleep.  Follow-up care  Follow up with your healthcare provider as advised or sooner if you are not getting better in the next 2 days.  When to seek medical advice  Call your healthcare provider right away if any of these occur:  · Fever of 100.4°F (38°C) or higher, or as directed by your healthcare provider  · Repeated vomiting or swelling of the abdomen  · Weakness, dizziness, light-headedness  · Pain in your abdomen that gets worse, severe, or spreads to your back  · Pain that moves to the right lower abdomen  · Rectal bleeding (stools that are red, black or maroon color)  · Unexpected vaginal bleeding  Date Last Reviewed: 9/1/2016  © 6887-1377 [employer]. [address] [place] [address], [place] [address]. All rights reserved. This information is not intended as a substitute for professional medical care. Always follow your healthcare professional's instructions.      If your pain increases or you have fever, vomiting, or feel you are worsening in any way please go to the ER for evaluation and treatment.

## 2018-02-10 NOTE — PROGRESS NOTES
"Subjective:       Patient ID: Adelina Caro is a 72 y.o. female.    Chief Complaint: Abdominal Pain (left side); Back Pain (left lower); and Flank Pain (left side)    Abdominal Pain   This is a new problem. The current episode started in the past 7 days (for three days). The onset quality is gradual. The problem occurs constantly. The problem has been gradually worsening. The pain is located in the LLQ (first started more as left low back pain now mostly in the LLQ). The pain is moderate. The quality of the pain is aching. The abdominal pain does not radiate. Associated symptoms include nausea. Pertinent negatives include no dysuria, fever, frequency, headaches, hematuria, myalgias or vomiting. Nothing aggravates the pain. The pain is relieved by nothing (tried laxative for bowel movements, had stool but no relief of pain). Treatments tried: advil. The treatment provided no relief.   Back Pain   Associated symptoms include abdominal pain. Pertinent negatives include no dysuria, fever or headaches.   Flank Pain   Associated symptoms include abdominal pain. Pertinent negatives include no dysuria, fever or headaches.     Review of Systems   Constitutional: Positive for chills (has been having chills). Negative for fatigue and fever.   HENT: Negative for congestion, rhinorrhea, sneezing and sore throat.    Respiratory: Negative for cough and shortness of breath.    Gastrointestinal: Positive for abdominal pain and nausea. Negative for vomiting.   Genitourinary: Positive for flank pain. Negative for difficulty urinating, dysuria, frequency, hematuria and urgency.   Musculoskeletal: Positive for back pain. Negative for myalgias.   Skin: Negative for rash.   Neurological: Negative for headaches.       Objective:      BP (!) 170/76 (BP Location: Right arm, Patient Position: Sitting, BP Method: Medium (Manual))   Pulse 69   Temp 98.2 °F (36.8 °C) (Tympanic)   Ht 5' 2.5" (1.588 m)   Wt 81.7 kg (180 lb 0.1 oz)   SpO2 " 99%   BMI 32.40 kg/m²   Physical Exam   Constitutional: She is oriented to person, place, and time. She appears well-developed and well-nourished. No distress.   HENT:   Head: Normocephalic and atraumatic.   Right Ear: External ear normal.   Left Ear: External ear normal.   Nose: Nose normal.   Eyes: Conjunctivae and EOM are normal. Right eye exhibits no discharge. Left eye exhibits no discharge.   Neck: Normal range of motion. Neck supple.   Cardiovascular: Normal rate, regular rhythm, normal heart sounds and intact distal pulses.  Exam reveals no gallop and no friction rub.    No murmur heard.  Pulmonary/Chest: Effort normal and breath sounds normal. No respiratory distress. She has no wheezes. She has no rales.   Abdominal: Soft. Bowel sounds are normal. She exhibits no distension. There is tenderness in the left lower quadrant. There is no rebound, no guarding (no guarding or rebound) and no CVA tenderness.   Neurological: She is alert and oriented to person, place, and time.   Skin: Skin is warm and dry. No rash noted. She is not diaphoretic. No erythema.   Vitals reviewed.      Assessment:       1. Diverticulitis        Plan:       Diverticulitis  -     ciprofloxacin HCl (CIPRO) 500 MG tablet; Take 1 tablet (500 mg total) by mouth 2 (two) times daily.  Dispense: 20 tablet; Refill: 0  -     metroNIDAZOLE (FLAGYL) 500 MG tablet; Take 1 tablet (500 mg total) by mouth 3 (three) times daily.  Dispense: 30 tablet; Refill: 0    Will empirically treat for diverticulitis. No evidence of rash at present although cautioned patient given pain distribution that if rash develops she should contact us right away as may be shingles. She believes the pain is similar to past episodes of diverticulitis and given exam findings agree with starting antibiotics at this time. Cipro/flagyl with strong ER warnings.    If your pain increases or you have fever, vomiting, or feel you are worsening in any way please go to the ER for  evaluation and treatment.      Heather Trant PA-C [facility]

## 2018-02-18 RX ORDER — METFORMIN HYDROCHLORIDE 500 MG/1
TABLET ORAL
Qty: 120 TABLET | Refills: 5 | OUTPATIENT
Start: 2018-02-18